# Patient Record
Sex: MALE | Race: WHITE | NOT HISPANIC OR LATINO | Employment: FULL TIME | URBAN - METROPOLITAN AREA
[De-identification: names, ages, dates, MRNs, and addresses within clinical notes are randomized per-mention and may not be internally consistent; named-entity substitution may affect disease eponyms.]

---

## 2023-04-07 ENCOUNTER — TELEPHONE (OUTPATIENT)
Dept: PRIMARY CARE | Facility: CLINIC | Age: 33
End: 2023-04-07
Payer: COMMERCIAL

## 2023-04-10 DIAGNOSIS — F41.9 ANXIETY: ICD-10-CM

## 2023-04-10 DIAGNOSIS — I10 ESSENTIAL HYPERTENSION: Primary | ICD-10-CM

## 2023-04-10 DIAGNOSIS — K21.9 GASTROESOPHAGEAL REFLUX DISEASE WITHOUT ESOPHAGITIS: ICD-10-CM

## 2023-04-10 RX ORDER — LOSARTAN POTASSIUM AND HYDROCHLOROTHIAZIDE 25; 100 MG/1; MG/1
1 TABLET ORAL DAILY
Qty: 90 TABLET | Refills: 3 | Status: SHIPPED | OUTPATIENT
Start: 2023-04-10 | End: 2023-04-11 | Stop reason: SDUPTHER

## 2023-04-10 RX ORDER — AMLODIPINE BESYLATE 5 MG/1
5 TABLET ORAL DAILY
Qty: 7 TABLET | Refills: 0 | Status: SHIPPED | OUTPATIENT
Start: 2023-04-10 | End: 2023-04-10 | Stop reason: SDUPTHER

## 2023-04-10 RX ORDER — PANTOPRAZOLE SODIUM 40 MG/1
40 TABLET, DELAYED RELEASE ORAL DAILY
Qty: 7 TABLET | Refills: 0 | Status: SHIPPED | OUTPATIENT
Start: 2023-04-10 | End: 2023-04-10 | Stop reason: SDUPTHER

## 2023-04-10 RX ORDER — PANTOPRAZOLE SODIUM 40 MG/1
40 TABLET, DELAYED RELEASE ORAL DAILY
Qty: 90 TABLET | Refills: 3 | Status: SHIPPED | OUTPATIENT
Start: 2023-04-10 | End: 2023-04-11 | Stop reason: SDUPTHER

## 2023-04-10 RX ORDER — LOSARTAN POTASSIUM AND HYDROCHLOROTHIAZIDE 25; 100 MG/1; MG/1
1 TABLET ORAL DAILY
COMMUNITY
Start: 2020-11-23 | End: 2023-04-10 | Stop reason: SDUPTHER

## 2023-04-10 RX ORDER — AMLODIPINE BESYLATE 5 MG/1
5 TABLET ORAL DAILY
Qty: 90 TABLET | Refills: 3 | Status: SHIPPED | OUTPATIENT
Start: 2023-04-10 | End: 2023-04-11 | Stop reason: SDUPTHER

## 2023-04-10 RX ORDER — PAROXETINE HYDROCHLORIDE 40 MG/1
1 TABLET, FILM COATED ORAL DAILY
COMMUNITY
Start: 2020-07-14 | End: 2023-04-10 | Stop reason: SDUPTHER

## 2023-04-10 RX ORDER — PAROXETINE HYDROCHLORIDE 40 MG/1
40 TABLET, FILM COATED ORAL DAILY
Qty: 90 TABLET | Refills: 3 | Status: SHIPPED | OUTPATIENT
Start: 2023-04-10 | End: 2023-04-11 | Stop reason: SDUPTHER

## 2023-04-10 RX ORDER — PAROXETINE HYDROCHLORIDE 40 MG/1
40 TABLET, FILM COATED ORAL DAILY
Qty: 7 TABLET | Refills: 0 | Status: SHIPPED | OUTPATIENT
Start: 2023-04-10 | End: 2023-04-10 | Stop reason: SDUPTHER

## 2023-04-10 RX ORDER — AMLODIPINE BESYLATE 5 MG/1
1 TABLET ORAL DAILY
COMMUNITY
Start: 2021-08-20 | End: 2023-04-10 | Stop reason: SDUPTHER

## 2023-04-10 RX ORDER — LOSARTAN POTASSIUM AND HYDROCHLOROTHIAZIDE 25; 100 MG/1; MG/1
1 TABLET ORAL DAILY
Qty: 7 TABLET | Refills: 0 | Status: SHIPPED | OUTPATIENT
Start: 2023-04-10 | End: 2023-04-10 | Stop reason: SDUPTHER

## 2023-04-10 RX ORDER — PANTOPRAZOLE SODIUM 40 MG/1
1 TABLET, DELAYED RELEASE ORAL DAILY
COMMUNITY
Start: 2020-07-14 | End: 2023-04-10 | Stop reason: SDUPTHER

## 2023-04-10 NOTE — PROGRESS NOTES
Subjective   Patient ID: Les Whitney is a 32 y.o. male who presents for No chief complaint on file..  HPI    Review of Systems  No current outpatient medications on file prior to visit.     No current facility-administered medications on file prior to visit.         Objective   Physical Exam    No visits with results within 7 Day(s) from this visit.   Latest known visit with results is:   [facility] Encounter on 09/08/2022   Component Date Value Ref Range Status    Tobacco Screen, Urine 09/08/2022 NEGATIVE   Final    Comment: Cotinine, a metabolite of nicotine, is measured to screen   for nicotine exposure. The cut-off is set at 300ng/mL to   detect active exposure (smoking).    This test was developed and its performance characteristics   were determined by the [facility].         Assessment/Plan

## 2023-04-11 DIAGNOSIS — I10 ESSENTIAL HYPERTENSION: ICD-10-CM

## 2023-04-11 DIAGNOSIS — F41.9 ANXIETY: ICD-10-CM

## 2023-04-11 DIAGNOSIS — K21.9 GASTROESOPHAGEAL REFLUX DISEASE WITHOUT ESOPHAGITIS: ICD-10-CM

## 2023-04-11 RX ORDER — PAROXETINE HYDROCHLORIDE 40 MG/1
40 TABLET, FILM COATED ORAL DAILY
Qty: 7 TABLET | Refills: 0 | Status: SHIPPED | OUTPATIENT
Start: 2023-04-11 | End: 2023-04-14 | Stop reason: SDUPTHER

## 2023-04-11 RX ORDER — PANTOPRAZOLE SODIUM 40 MG/1
40 TABLET, DELAYED RELEASE ORAL DAILY
Qty: 7 TABLET | Refills: 0 | Status: SHIPPED | OUTPATIENT
Start: 2023-04-11 | End: 2023-04-14 | Stop reason: SDUPTHER

## 2023-04-11 RX ORDER — LOSARTAN POTASSIUM AND HYDROCHLOROTHIAZIDE 25; 100 MG/1; MG/1
1 TABLET ORAL DAILY
Qty: 7 TABLET | Refills: 0 | Status: SHIPPED | OUTPATIENT
Start: 2023-04-11 | End: 2023-04-14 | Stop reason: SDUPTHER

## 2023-04-11 RX ORDER — AMLODIPINE BESYLATE 5 MG/1
5 TABLET ORAL DAILY
Qty: 7 TABLET | Refills: 0 | Status: SHIPPED | OUTPATIENT
Start: 2023-04-11 | End: 2023-04-14 | Stop reason: SDUPTHER

## 2023-04-11 NOTE — PROGRESS NOTES
Subjective   Patient ID: Les Whitney is a 32 y.o. male who presents for No chief complaint on file..  HPI    Review of Systems  Current Outpatient Medications on File Prior to Visit   Medication Sig Dispense Refill    amLODIPine (Norvasc) 5 mg tablet Take 1 tablet (5 mg) by mouth once daily. 90 tablet 3    losartan-hydrochlorothiazide (Hyzaar) 100-25 mg tablet Take 1 tablet by mouth once daily. 90 tablet 3    pantoprazole (ProtoNix) 40 mg EC tablet Take 1 tablet (40 mg) by mouth once daily. 90 tablet 3    PARoxetine (Paxil) 40 mg tablet Take 1 tablet (40 mg) by mouth once daily. 90 tablet 3    [DISCONTINUED] amLODIPine (Norvasc) 5 mg tablet Take 1 tablet (5 mg) by mouth once daily.      [DISCONTINUED] amLODIPine (Norvasc) 5 mg tablet Take 1 tablet (5 mg) by mouth once daily for 7 days. 7 tablet 0    [DISCONTINUED] losartan-hydrochlorothiazide (Hyzaar) 100-25 mg tablet Take 1 tablet by mouth once daily.      [DISCONTINUED] losartan-hydrochlorothiazide (Hyzaar) 100-25 mg tablet Take 1 tablet by mouth once daily for 7 days. 7 tablet 0    [DISCONTINUED] pantoprazole (ProtoNix) 40 mg EC tablet Take 1 tablet (40 mg) by mouth once daily.      [DISCONTINUED] pantoprazole (ProtoNix) 40 mg EC tablet Take 1 tablet (40 mg) by mouth once daily for 7 days. 7 tablet 0    [DISCONTINUED] PARoxetine (Paxil) 40 mg tablet Take 1 tablet (40 mg) by mouth once daily.      [DISCONTINUED] PARoxetine (Paxil) 40 mg tablet Take 1 tablet (40 mg) by mouth once daily for 7 days. 7 tablet 0     No current facility-administered medications on file prior to visit.         Objective   Physical Exam    No visits with results within 7 Day(s) from this visit.   Latest known visit with results is:   Legacy Encounter on 09/08/2022   Component Date Value Ref Range Status    Tobacco Screen, Urine 09/08/2022 NEGATIVE   Final    Comment: Cotinine, a metabolite of nicotine, is measured to screen   for nicotine exposure. The cut-off is set at 300ng/mL to    detect active exposure (smoking).    This test was developed and its performance characteristics   were determined by the [facility].         Assessment/Plan

## 2023-04-14 DIAGNOSIS — I10 ESSENTIAL HYPERTENSION: ICD-10-CM

## 2023-04-14 DIAGNOSIS — K21.9 GASTROESOPHAGEAL REFLUX DISEASE WITHOUT ESOPHAGITIS: ICD-10-CM

## 2023-04-14 DIAGNOSIS — F41.9 ANXIETY: ICD-10-CM

## 2023-04-14 RX ORDER — LOSARTAN POTASSIUM AND HYDROCHLOROTHIAZIDE 25; 100 MG/1; MG/1
1 TABLET ORAL DAILY
Qty: 90 TABLET | Refills: 1 | Status: SHIPPED | OUTPATIENT
Start: 2023-04-14 | End: 2023-04-14

## 2023-04-14 RX ORDER — AMLODIPINE BESYLATE 5 MG/1
5 TABLET ORAL DAILY
Qty: 90 TABLET | Refills: 1 | Status: SHIPPED | OUTPATIENT
Start: 2023-04-14 | End: 2023-04-14

## 2023-04-14 RX ORDER — PAROXETINE HYDROCHLORIDE 40 MG/1
40 TABLET, FILM COATED ORAL DAILY
Qty: 90 TABLET | Refills: 1 | Status: SHIPPED | OUTPATIENT
Start: 2023-04-14 | End: 2023-04-14

## 2023-04-14 RX ORDER — PANTOPRAZOLE SODIUM 40 MG/1
40 TABLET, DELAYED RELEASE ORAL DAILY
Qty: 90 TABLET | Refills: 1 | Status: SHIPPED | OUTPATIENT
Start: 2023-04-14 | End: 2023-04-14

## 2023-06-08 DIAGNOSIS — E88.810 INSULIN RESISTANCE SYNDROME: Primary | ICD-10-CM

## 2023-06-22 ENCOUNTER — OFFICE VISIT (OUTPATIENT)
Dept: PRIMARY CARE | Facility: CLINIC | Age: 33
End: 2023-06-22
Payer: COMMERCIAL

## 2023-06-22 VITALS
WEIGHT: 315 LBS | HEART RATE: 79 BPM | RESPIRATION RATE: 16 BRPM | SYSTOLIC BLOOD PRESSURE: 142 MMHG | HEIGHT: 69 IN | BODY MASS INDEX: 46.65 KG/M2 | OXYGEN SATURATION: 97 % | DIASTOLIC BLOOD PRESSURE: 90 MMHG

## 2023-06-22 DIAGNOSIS — E66.01 CLASS 3 SEVERE OBESITY DUE TO EXCESS CALORIES WITH SERIOUS COMORBIDITY AND BODY MASS INDEX (BMI) OF 45.0 TO 49.9 IN ADULT (MULTI): ICD-10-CM

## 2023-06-22 DIAGNOSIS — E88.810 INSULIN RESISTANCE SYNDROME: Primary | ICD-10-CM

## 2023-06-22 DIAGNOSIS — I10 ESSENTIAL HYPERTENSION: ICD-10-CM

## 2023-06-22 PROBLEM — E66.813 CLASS 3 SEVERE OBESITY DUE TO EXCESS CALORIES WITH SERIOUS COMORBIDITY AND BODY MASS INDEX (BMI) OF 45.0 TO 49.9 IN ADULT: Status: ACTIVE | Noted: 2023-06-22

## 2023-06-22 PROCEDURE — 3008F BODY MASS INDEX DOCD: CPT | Performed by: INTERNAL MEDICINE

## 2023-06-22 PROCEDURE — 1036F TOBACCO NON-USER: CPT | Performed by: INTERNAL MEDICINE

## 2023-06-22 PROCEDURE — 3077F SYST BP >= 140 MM HG: CPT | Performed by: INTERNAL MEDICINE

## 2023-06-22 PROCEDURE — 99213 OFFICE O/P EST LOW 20 MIN: CPT | Performed by: INTERNAL MEDICINE

## 2023-06-22 PROCEDURE — 3080F DIAST BP >= 90 MM HG: CPT | Performed by: INTERNAL MEDICINE

## 2023-06-22 RX ORDER — APREMILAST 30 MG/1
1 TABLET, FILM COATED ORAL 2 TIMES DAILY
COMMUNITY
Start: 2022-01-07 | End: 2023-10-20 | Stop reason: SDUPTHER

## 2023-06-22 ASSESSMENT — PATIENT HEALTH QUESTIONNAIRE - PHQ9
2. FEELING DOWN, DEPRESSED OR HOPELESS: NOT AT ALL
SUM OF ALL RESPONSES TO PHQ9 QUESTIONS 1 AND 2: 0
1. LITTLE INTEREST OR PLEASURE IN DOING THINGS: NOT AT ALL

## 2023-06-22 ASSESSMENT — ENCOUNTER SYMPTOMS
DIARRHEA: 0
CHILLS: 0
CONSTIPATION: 0
ENDOCRINE COMMENTS: PER HPI
COUGH: 0
SHORTNESS OF BREATH: 0
VOMITING: 0
JOINT SWELLING: 0
FEVER: 0
MYALGIAS: 0
NAUSEA: 0
DIAPHORESIS: 0

## 2023-06-22 NOTE — PATIENT INSTRUCTIONS
"ALTHOUGH THE TAKING OF RYBELSUS STYLE OF MEDICATION FOR WEIGHT LOSS PURPOSES IS AN \"OFF-LABEL\" USE OF THIS MEDICATION, THE SCIENCE SEEMS TO POINT TO THE FACT THAT THE USE OF THESE MEDICATIONS IN \"PRE-DIABETIC\" PEOPLE TO PREVENT DIABETES IS GOING TO BECOME QUITE USEFUL    2.  IT'S EFFECT DESPITE INITIALLY INCREASING INSULIN, IS TO EFFECT THE \"INCRETIN HORMONE SYSTEM\" WHICH ALLOWS PEOPLE TO FEEL SATIETY AFTER EATING, THEREFORE ABLE TO FILL FULL WITH LESS FOOD.  OVER TIME THE WEIGHT LOSS THAT ENSUES LEADS TO A GREAT DEAL OF POSITIVE PHYSIOLOGIC EFFECTS INCLUDING RESTORATION OF INSULIN SENSITIVITY, WHICH ITSELF CAN PROPGATE A MORE NORMAL BODY WEIGHT MAINTENANCE ABILITY    3.  IT IS ALSO VERY POSSIBLE THAT IN EVENTUALLY REDUCING INSULIN BURDEN IN PEOPLE, THE CORRESPONDING TOTAL BODY INFLAMMATION THAT RESULTS FROM HIGHER CIRCULATING INSULIN WILL RESULT IN LESS INFLAMMATION, AND IN THEORY LESS CARDIOVASCULAR AND NEUROVASCULAR EVENTS - HEART ATTACK AND STROKE    4.  THESE ARE MY OPINIONS ONLY, BUT THUS FAR I BELIEVE MY OPINIONS ARE CORRECT, AS THE MARKET HAS SHOWN THAT EVERYONE WANTS THESE MEDS BECAUSE THEY WORK.    5.  FOLLOW UP IN  R MONTHS OR AS NEEDED    "

## 2023-06-22 NOTE — PROGRESS NOTES
"Subjective   Les Whitney is a 33 y.o. male who presents for HAS RX TO SENT TO [facility] FOR RYBELSUS THAT YOU ALREADY WROTE WOULD LIKE TO DISCUSS FURTHER   PT HAS  INSURANCE    HPI   NEED A CHECK IN    80 POUNDS OVER PRE-COVID WEIGHT, STRUGGLING WITH IT    AT ONE POINT RX RYBELSUS FOR WEIGHT LOSS.  LOST 30 POUNDS IN 2.5  MONTHS    IN 2021 GOT COVID, THEN HAD REALLY BAD PSORIASIS ALLOVER BODY, WENT TO DERM, GOT ON OTEZLA, WORKED AFTER 6 MONTHS    SIDE EFFECTS FROM OTEZLA WERE SO BAD RE: NAUSEA, SO STOPPED RYBELSUS    NOW NO PSORIASIS, TAKING HALF NORMAL DOSE OTEZLA, SO WANT TO TRY GET BACK ON RYBELSUS      Review of Systems   Constitutional:  Negative for chills, diaphoresis and fever.   Respiratory:  Negative for cough and shortness of breath.    Cardiovascular:  Negative for chest pain and leg swelling.   Gastrointestinal:  Negative for constipation, diarrhea, nausea and vomiting.   Endocrine:        PER HPI   Musculoskeletal:  Negative for joint swelling and myalgias.       Objective   /90   Pulse 79   Resp 16   Ht 1.74 m (5' 8.5\")   Wt 149 kg (329 lb)   SpO2 97%   BMI 49.30 kg/m²     Physical Exam  Vitals reviewed.   Constitutional:       General: He is not in acute distress.     Appearance: He is obese. He is not ill-appearing.   Cardiovascular:      Rate and Rhythm: Normal rate and regular rhythm.      Pulses: Normal pulses.      Heart sounds:      No gallop.   Pulmonary:      Breath sounds: Normal breath sounds. No wheezing, rhonchi or rales.   Abdominal:      General: Abdomen is flat. Bowel sounds are normal.      Palpations: Abdomen is soft.      Tenderness: There is no guarding or rebound.   Musculoskeletal:      Right lower leg: No edema.      Left lower leg: No edema.         Assessment/Plan   Problem List Items Addressed This Visit       Essential hypertension    Insulin resistance syndrome - Primary    Class 3 severe obesity due to excess calories with serious comorbidity and body mass " "index (BMI) of 45.0 to 49.9 in adult (CMS/[facility])     Patient Instructions   ALTHOUGH THE TAKING OF RYBELSUS STYLE OF MEDICATION FOR WEIGHT LOSS PURPOSES IS AN \"OFF-LABEL\" USE OF THIS MEDICATION, THE SCIENCE SEEMS TO POINT TO THE FACT THAT THE USE OF THESE MEDICATIONS IN \"PRE-DIABETIC\" PEOPLE TO PREVENT DIABETES IS GOING TO BECOME QUITE USEFUL    2.  IT'S EFFECT DESPITE INITIALLY INCREASING INSULIN, IS TO EFFECT THE \"INCRETIN HORMONE SYSTEM\" WHICH ALLOWS PEOPLE TO FEEL SATIETY AFTER EATING, THEREFORE ABLE TO FILL FULL WITH LESS FOOD.  OVER TIME THE WEIGHT LOSS THAT ENSUES LEADS TO A GREAT DEAL OF POSITIVE PHYSIOLOGIC EFFECTS INCLUDING RESTORATION OF INSULIN SENSITIVITY, WHICH ITSELF CAN PROPGATE A MORE NORMAL BODY WEIGHT MAINTENANCE ABILITY    3.  IT IS ALSO VERY POSSIBLE THAT IN EVENTUALLY REDUCING INSULIN BURDEN IN PEOPLE, THE CORRESPONDING TOTAL BODY INFLAMMATION THAT RESULTS FROM HIGHER CIRCULATING INSULIN WILL RESULT IN LESS INFLAMMATION, AND IN THEORY LESS CARDIOVASCULAR AND NEUROVASCULAR EVENTS - HEART ATTACK AND STROKE    4.  THESE ARE MY OPINIONS ONLY, BUT THUS FAR I BELIEVE MY OPINIONS ARE CORRECT, AS THE MARKET HAS SHOWN THAT EVERYONE WANTS THESE MEDS BECAUSE THEY WORK.    5.  FOLLOW UP IN  R MONTHS OR AS NEEDED    "

## 2023-10-02 DIAGNOSIS — F41.9 ANXIETY: ICD-10-CM

## 2023-10-02 DIAGNOSIS — I10 ESSENTIAL HYPERTENSION: ICD-10-CM

## 2023-10-02 DIAGNOSIS — K21.9 GASTROESOPHAGEAL REFLUX DISEASE WITHOUT ESOPHAGITIS: ICD-10-CM

## 2023-10-05 RX ORDER — LOSARTAN POTASSIUM AND HYDROCHLOROTHIAZIDE 25; 100 MG/1; MG/1
1 TABLET ORAL DAILY
Qty: 90 TABLET | Refills: 3 | Status: SHIPPED | OUTPATIENT
Start: 2023-10-05 | End: 2024-09-29

## 2023-10-05 RX ORDER — AMLODIPINE BESYLATE 5 MG/1
5 TABLET ORAL
Qty: 90 TABLET | Refills: 1 | Status: SHIPPED | OUTPATIENT
Start: 2023-10-05 | End: 2024-04-03 | Stop reason: SDUPTHER

## 2023-10-05 RX ORDER — PAROXETINE HYDROCHLORIDE 40 MG/1
40 TABLET, FILM COATED ORAL
Qty: 90 TABLET | Refills: 1 | Status: SHIPPED | OUTPATIENT
Start: 2023-10-05 | End: 2024-04-03 | Stop reason: SDUPTHER

## 2023-10-05 RX ORDER — PANTOPRAZOLE SODIUM 40 MG/1
40 TABLET, DELAYED RELEASE ORAL
Qty: 90 TABLET | Refills: 1 | Status: SHIPPED | OUTPATIENT
Start: 2023-10-05 | End: 2024-04-03 | Stop reason: SDUPTHER

## 2023-10-06 ENCOUNTER — PHARMACY VISIT (OUTPATIENT)
Dept: PHARMACY | Facility: CLINIC | Age: 33
End: 2023-10-06
Payer: COMMERCIAL

## 2023-10-06 PROCEDURE — RXMED WILLOW AMBULATORY MEDICATION CHARGE

## 2023-10-18 PROBLEM — Z98.84 BARIATRIC SURGERY STATUS: Status: ACTIVE | Noted: 2023-10-18

## 2023-10-18 PROBLEM — G47.33 OSA ON CPAP: Status: ACTIVE | Noted: 2023-10-18

## 2023-10-18 PROBLEM — B35.4 TINEA CORPORIS: Status: ACTIVE | Noted: 2023-10-18

## 2023-10-18 PROBLEM — D22.5 MELANOCYTIC NEVI OF TRUNK: Status: ACTIVE | Noted: 2022-09-30

## 2023-10-18 PROBLEM — L40.4 GUTTATE PSORIASIS: Status: ACTIVE | Noted: 2022-01-14

## 2023-10-18 PROBLEM — L30.9 ECZEMA: Status: ACTIVE | Noted: 2023-10-18

## 2023-10-18 PROBLEM — J32.9 CHRONIC SINUSITIS: Status: ACTIVE | Noted: 2023-10-18

## 2023-10-18 PROBLEM — T78.40XA ALLERGIC REACTION: Status: ACTIVE | Noted: 2023-10-18

## 2023-10-18 PROBLEM — H65.90 OTITIS MEDIA WITH EFFUSION: Status: ACTIVE | Noted: 2023-10-18

## 2023-10-18 PROBLEM — M25.649 FINGER STIFFNESS: Status: ACTIVE | Noted: 2023-10-18

## 2023-10-18 PROBLEM — L40.0 PSORIASIS VULGARIS: Status: ACTIVE | Noted: 2022-01-13

## 2023-10-18 PROBLEM — D48.5 NEOPLASM OF UNCERTAIN BEHAVIOR OF SKIN: Status: ACTIVE | Noted: 2022-09-30

## 2023-10-18 PROBLEM — R69 DISEASE SUSPECTED: Status: ACTIVE | Noted: 2023-10-18

## 2023-10-18 PROBLEM — L40.9 PSORIASIS: Status: ACTIVE | Noted: 2023-10-18

## 2023-10-18 PROBLEM — R60.0 LOWER EXTREMITY EDEMA: Status: ACTIVE | Noted: 2023-10-18

## 2023-10-18 PROBLEM — L57.8 OTHER SKIN CHANGES DUE TO CHRONIC EXPOSURE TO NONIONIZING RADIATION: Status: ACTIVE | Noted: 2023-07-21

## 2023-10-18 PROBLEM — R53.83 FATIGUE: Status: ACTIVE | Noted: 2023-10-18

## 2023-10-18 PROBLEM — R29.818 SUSPECTED SLEEP APNEA: Status: ACTIVE | Noted: 2023-10-18

## 2023-10-18 PROBLEM — L21.9 SEBORRHEIC DERMATITIS: Status: ACTIVE | Noted: 2023-10-18

## 2023-10-18 PROBLEM — E88.810 METABOLIC SYNDROME: Status: ACTIVE | Noted: 2023-10-18

## 2023-10-18 PROBLEM — F41.1 GENERALIZED ANXIETY DISORDER: Status: ACTIVE | Noted: 2023-10-18

## 2023-10-18 PROBLEM — L30.9 DERMATITIS, UNSPECIFIED: Status: ACTIVE | Noted: 2023-10-18

## 2023-10-18 PROBLEM — R10.13 DYSPEPSIA: Status: ACTIVE | Noted: 2023-10-18

## 2023-10-18 RX ORDER — ORAL SEMAGLUTIDE 7 MG/1
1 TABLET ORAL
COMMUNITY
Start: 2021-01-08 | End: 2023-10-20 | Stop reason: ALTCHOICE

## 2023-10-18 RX ORDER — SALICYLIC ACID 0.03 G/ML
SHAMPOO TOPICAL
COMMUNITY
Start: 2021-12-09 | End: 2023-10-20 | Stop reason: ALTCHOICE

## 2023-10-18 RX ORDER — DOXYCYCLINE 100 MG/1
100 CAPSULE ORAL EVERY 12 HOURS
COMMUNITY
Start: 2022-11-13 | End: 2023-10-20 | Stop reason: ALTCHOICE

## 2023-10-18 RX ORDER — POLYMYXIN B SULFATE AND TRIMETHOPRIM 1; 10000 MG/ML; [USP'U]/ML
SOLUTION OPHTHALMIC
COMMUNITY
Start: 2022-11-14 | End: 2023-10-20 | Stop reason: ALTCHOICE

## 2023-10-18 RX ORDER — LOSARTAN POTASSIUM 100 MG/1
50 TABLET ORAL
COMMUNITY
Start: 2020-05-19 | End: 2023-12-28 | Stop reason: ALTCHOICE

## 2023-10-18 RX ORDER — SEMAGLUTIDE 1 MG/.5ML
1 INJECTION, SOLUTION SUBCUTANEOUS
COMMUNITY
Start: 2021-07-26 | End: 2023-10-20 | Stop reason: ALTCHOICE

## 2023-10-18 RX ORDER — CALCIPOTRIENE AND BETAMETHASONE DIPROPIONATE 50; .5 UG/G; MG/G
1 AEROSOL, FOAM TOPICAL
COMMUNITY
Start: 2022-01-07 | End: 2023-12-28 | Stop reason: ALTCHOICE

## 2023-10-18 RX ORDER — LOSARTAN POTASSIUM 25 MG/1
25 TABLET ORAL 4 TIMES DAILY
COMMUNITY
Start: 2020-02-10 | End: 2023-10-20 | Stop reason: ALTCHOICE

## 2023-10-18 RX ORDER — FLUOXETINE HYDROCHLORIDE 40 MG/1
40 CAPSULE ORAL
COMMUNITY
Start: 2020-02-03 | End: 2023-10-20 | Stop reason: ALTCHOICE

## 2023-10-18 RX ORDER — HYDROCORTISONE 25 MG/G
CREAM TOPICAL 2 TIMES DAILY
COMMUNITY
Start: 2021-12-09 | End: 2023-10-20 | Stop reason: ALTCHOICE

## 2023-10-18 RX ORDER — ALBUTEROL SULFATE 90 UG/1
2 AEROSOL, METERED RESPIRATORY (INHALATION) 4 TIMES DAILY PRN
COMMUNITY
End: 2023-10-20 | Stop reason: ALTCHOICE

## 2023-10-18 RX ORDER — LOSARTAN POTASSIUM 50 MG/1
50 TABLET ORAL DAILY
COMMUNITY
Start: 2019-03-21 | End: 2023-10-20 | Stop reason: ALTCHOICE

## 2023-10-18 RX ORDER — PAROXETINE HYDROCHLORIDE 40 MG/1
40 TABLET, FILM COATED ORAL DAILY
COMMUNITY
Start: 2020-07-14 | End: 2023-10-20 | Stop reason: SDUPTHER

## 2023-10-18 RX ORDER — KETOCONAZOLE 20 MG/G
CREAM TOPICAL 2 TIMES DAILY
COMMUNITY
Start: 2021-12-09 | End: 2023-10-20 | Stop reason: ALTCHOICE

## 2023-10-18 RX ORDER — ERGOCALCIFEROL 1.25 MG/1
50000 CAPSULE ORAL 2 TIMES WEEKLY
COMMUNITY
Start: 2020-11-24 | End: 2023-10-20 | Stop reason: ALTCHOICE

## 2023-10-20 ENCOUNTER — OFFICE VISIT (OUTPATIENT)
Dept: DERMATOLOGY | Facility: CLINIC | Age: 33
End: 2023-10-20
Payer: COMMERCIAL

## 2023-10-20 DIAGNOSIS — Z79.899 ENCOUNTER FOR LONG-TERM (CURRENT) USE OF MEDICATIONS: ICD-10-CM

## 2023-10-20 DIAGNOSIS — L40.0 PSORIASIS VULGARIS: Primary | ICD-10-CM

## 2023-10-20 PROCEDURE — 99213 OFFICE O/P EST LOW 20 MIN: CPT | Performed by: DERMATOLOGY

## 2023-10-20 PROCEDURE — 1036F TOBACCO NON-USER: CPT | Performed by: DERMATOLOGY

## 2023-10-20 PROCEDURE — 3008F BODY MASS INDEX DOCD: CPT | Performed by: DERMATOLOGY

## 2023-10-20 ASSESSMENT — DERMATOLOGY PATIENT ASSESSMENT
DO YOU USE SUNSCREEN: OCCASIONALLY
DO YOU USE A TANNING BED: NO
FOR PATIENTS COMING IN FOR A FOLLOW-UP VISIT - HAVE THERE BEEN ANY CHANGES IN YOUR HEALTH SINCE YOUR LAST VISIT: NO
DO YOU HAVE ANY NEW OR CHANGING LESIONS: NO
ARE YOU AN ORGAN TRANSPLANT RECIPIENT: NO

## 2023-10-20 ASSESSMENT — DERMATOLOGY QUALITY OF LIFE (QOL) ASSESSMENT
ARE THERE EXCLUSIONS OR EXCEPTIONS FOR THE QUALITY OF LIFE ASSESSMENT: NO
RATE HOW EMOTIONALLY BOTHERED YOU ARE BY YOUR SKIN PROBLEM (FOR EXAMPLE, WORRY, EMBARRASSMENT, FRUSTRATION): 0 - NEVER BOTHERED
WHAT SINGLE SKIN CONDITION LISTED BELOW IS THE PATIENT ANSWERING THE QUALITY-OF-LIFE ASSESSMENT QUESTIONS ABOUT: PSORIASIS
RATE HOW BOTHERED YOU ARE BY EFFECTS OF YOUR SKIN PROBLEMS ON YOUR ACTIVITIES (EG, GOING OUT, ACCOMPLISHING WHAT YOU WANT, WORK ACTIVITIES OR YOUR RELATIONSHIPS WITH OTHERS): 0 - NEVER BOTHERED
RATE HOW BOTHERED YOU ARE BY SYMPTOMS OF YOUR SKIN PROBLEM (EG, ITCHING, STINGING BURNING, HURTING OR SKIN IRRITATION): 2

## 2023-10-20 ASSESSMENT — ITCH NUMERIC RATING SCALE: HOW SEVERE IS YOUR ITCHING?: 0

## 2023-10-20 NOTE — PROGRESS NOTES
Subjective     Les Whitney is a 33 y.o. male who presents for the following: Psoriasis (Pt here following up on Psoriasis, widespread over body. Current treatment Otezla 30mg QD-BID, has GI issues sometimes. Has Enstilar foam to use PRN for flares. Well controlled on regimen. Joint pain in left hand. ).     Review of Systems:  No other skin or systemic complaints other than what is documented elsewhere in the note.    The following portions of the chart were reviewed this encounter and updated as appropriate:          Skin Cancer History  No skin cancer on file.      Specialty Problems          Dermatology Problems    Psoriasis vulgaris    Guttate psoriasis    Melanocytic nevi of trunk    Neoplasm of uncertain behavior of skin    Other skin changes due to chronic exposure to nonionizing radiation    Dermatitis, unspecified    Eczema    Psoriasis    Seborrheic dermatitis    Tinea corporis        Objective   Well appearing patient in no apparent distress; mood and affect are within normal limits.    A focused skin examination was performed. All findings within normal limits unless otherwise noted below.    Assessment/Plan   1. Psoriasis vulgaris  Clear today, no active lesions    The chronic and intermittently flaring nature of this skin condition was discussed today.   This is an inflammatory skin condition that occurs in a bimodal age distribution in genetically pre-disposed individuals. The inflammatory skin condition causes skin to turn over 2x faster than normal skin. T  he various treatment options were reviewed which include: topical steroids, topical retinoids, nb-UVB therapy, systemic medications including oral medications and injectable (biologic) medications.     Patient well controlled on oral Otzela 30mg 2x daily and we recommend continuation  Continue Enstilar 2x daily as needed, declines need for refills at this time.    Side effects of medications reviewed.    We discussed if he does get a tattoo it  is a possibility that psoriasis could occur at tattoo site.    Related Procedures  Follow Up In Dermatology - Established Patient    Related Medications  apremilast (Otezla) 30 mg tablet  Take 1 tab 2x daily

## 2023-10-23 ENCOUNTER — PHARMACY VISIT (OUTPATIENT)
Dept: PHARMACY | Facility: CLINIC | Age: 33
End: 2023-10-23
Payer: COMMERCIAL

## 2023-10-23 PROCEDURE — RXMED WILLOW AMBULATORY MEDICATION CHARGE

## 2023-11-01 ENCOUNTER — SPECIALTY PHARMACY (OUTPATIENT)
Dept: PHARMACY | Facility: CLINIC | Age: 33
End: 2023-11-01

## 2023-11-30 ENCOUNTER — PHARMACY VISIT (OUTPATIENT)
Dept: PHARMACY | Facility: CLINIC | Age: 33
End: 2023-11-30
Payer: COMMERCIAL

## 2023-11-30 ENCOUNTER — SPECIALTY PHARMACY (OUTPATIENT)
Dept: PHARMACY | Facility: CLINIC | Age: 33
End: 2023-11-30

## 2023-11-30 PROCEDURE — RXMED WILLOW AMBULATORY MEDICATION CHARGE

## 2023-12-18 ENCOUNTER — SPECIALTY PHARMACY (OUTPATIENT)
Dept: PHARMACY | Facility: CLINIC | Age: 33
End: 2023-12-18

## 2023-12-21 ENCOUNTER — SPECIALTY PHARMACY (OUTPATIENT)
Dept: PHARMACY | Facility: CLINIC | Age: 33
End: 2023-12-21

## 2023-12-21 NOTE — PROGRESS NOTES
[facility] Clinical Note    Les Whitney is a 33 y.o. male, who is on the specialty pharmacy service for management of: Dermatology Core with status of: (Enrolled)     Les was contacted on 12/21/2023.    Refer to the encounter summary report for documentation details about patient counseling and education.      Medication Adherence  The importance of adherence was discussed with the patient and they were advised to take the medication as prescribed by their provider. Les was encouraged to call his physician's office if they have a question regarding a missed dose.        Patient advised to contact the pharmacy if there are any changes to her medication list, including prescriptions, OTC medications, herbal products, or supplements. Patient was advised of [facility]’s dispensing process, refill timeline, contact information ([phone]), and patient management follow up. Patient confirmed understanding of education conducted during assessment. All patient questions and concerns were addressed to the best of my ability. Patient was encouraged to contact the specialty pharmacy with any questions or concerns.    Confirmed follow-up outreaches are properly scheduled. Reviewed goals of therapy in the program targets.    Ishan Durant, PharmD

## 2023-12-28 ENCOUNTER — LAB (OUTPATIENT)
Dept: LAB | Facility: LAB | Age: 33
End: 2023-12-28
Payer: COMMERCIAL

## 2023-12-28 ENCOUNTER — OFFICE VISIT (OUTPATIENT)
Dept: PRIMARY CARE | Facility: CLINIC | Age: 33
End: 2023-12-28
Payer: COMMERCIAL

## 2023-12-28 VITALS
HEART RATE: 84 BPM | OXYGEN SATURATION: 95 % | DIASTOLIC BLOOD PRESSURE: 76 MMHG | SYSTOLIC BLOOD PRESSURE: 132 MMHG | HEIGHT: 69 IN | BODY MASS INDEX: 46.65 KG/M2 | RESPIRATION RATE: 14 BRPM | WEIGHT: 315 LBS

## 2023-12-28 DIAGNOSIS — R71.8 MICROCYTOSIS: ICD-10-CM

## 2023-12-28 DIAGNOSIS — E55.9 VITAMIN D DEFICIENCY: ICD-10-CM

## 2023-12-28 DIAGNOSIS — Z23 NEED FOR INFLUENZA VACCINATION: ICD-10-CM

## 2023-12-28 DIAGNOSIS — R71.8 MICROCYTOSIS: Primary | ICD-10-CM

## 2023-12-28 DIAGNOSIS — R53.82 CHRONIC FATIGUE: ICD-10-CM

## 2023-12-28 DIAGNOSIS — I10 ESSENTIAL HYPERTENSION: ICD-10-CM

## 2023-12-28 DIAGNOSIS — I10 ESSENTIAL HYPERTENSION: Primary | ICD-10-CM

## 2023-12-28 DIAGNOSIS — R74.01 TRANSAMINITIS: ICD-10-CM

## 2023-12-28 DIAGNOSIS — E87.6 HYPOKALEMIA: ICD-10-CM

## 2023-12-28 LAB
25(OH)D3 SERPL-MCNC: 18 NG/ML (ref 30–100)
ALBUMIN SERPL BCP-MCNC: 4.6 G/DL (ref 3.4–5)
ALP SERPL-CCNC: 72 U/L (ref 33–120)
ALT SERPL W P-5'-P-CCNC: 67 U/L (ref 10–52)
ANION GAP SERPL CALC-SCNC: 12 MMOL/L (ref 10–20)
AST SERPL W P-5'-P-CCNC: 51 U/L (ref 9–39)
BILIRUB SERPL-MCNC: 1.1 MG/DL (ref 0–1.2)
BUN SERPL-MCNC: 18 MG/DL (ref 6–23)
CALCIUM SERPL-MCNC: 9.3 MG/DL (ref 8.6–10.3)
CHLORIDE SERPL-SCNC: 99 MMOL/L (ref 98–107)
CHOLEST SERPL-MCNC: 204 MG/DL (ref 0–199)
CHOLESTEROL/HDL RATIO: 6.7
CO2 SERPL-SCNC: 28 MMOL/L (ref 21–32)
CREAT SERPL-MCNC: 0.79 MG/DL (ref 0.5–1.3)
CREAT UR-MCNC: 162.8 MG/DL (ref 20–370)
ERYTHROCYTE [DISTWIDTH] IN BLOOD BY AUTOMATED COUNT: 14.2 % (ref 11.5–14.5)
FERRITIN SERPL-MCNC: 125 NG/ML (ref 20–300)
GFR SERPL CREATININE-BSD FRML MDRD: >90 ML/MIN/1.73M*2
GLUCOSE SERPL-MCNC: 83 MG/DL (ref 74–99)
HCT VFR BLD AUTO: 41.8 % (ref 41–52)
HDLC SERPL-MCNC: 30.3 MG/DL
HGB BLD-MCNC: 13.6 G/DL (ref 13.5–17.5)
IRON SATN MFR SERPL: 11 % (ref 25–45)
IRON SERPL-MCNC: 37 UG/DL (ref 35–150)
LDLC SERPL CALC-MCNC: 153 MG/DL
MCH RBC QN AUTO: 25 PG (ref 26–34)
MCHC RBC AUTO-ENTMCNC: 32.5 G/DL (ref 32–36)
MCV RBC AUTO: 77 FL (ref 80–100)
NON HDL CHOLESTEROL: 174 MG/DL (ref 0–149)
NRBC BLD-RTO: 0 /100 WBCS (ref 0–0)
PLATELET # BLD AUTO: 206 X10*3/UL (ref 150–450)
POTASSIUM SERPL-SCNC: 3.4 MMOL/L (ref 3.5–5.3)
PROT SERPL-MCNC: 7.7 G/DL (ref 6.4–8.2)
PROT UR-ACNC: 9 MG/DL (ref 5–25)
PROT/CREAT UR: 0.06 MG/MG CREAT (ref 0–0.17)
RBC # BLD AUTO: 5.45 X10*6/UL (ref 4.5–5.9)
SODIUM SERPL-SCNC: 136 MMOL/L (ref 136–145)
TIBC SERPL-MCNC: 344 UG/DL (ref 240–445)
TRIGL SERPL-MCNC: 102 MG/DL (ref 0–149)
TSH SERPL-ACNC: 1.94 MIU/L (ref 0.44–3.98)
UIBC SERPL-MCNC: 307 UG/DL (ref 110–370)
VIT B12 SERPL-MCNC: 503 PG/ML (ref 211–911)
VLDL: 20 MG/DL (ref 0–40)
WBC # BLD AUTO: 8 X10*3/UL (ref 4.4–11.3)

## 2023-12-28 PROCEDURE — 85027 COMPLETE CBC AUTOMATED: CPT

## 2023-12-28 PROCEDURE — 82570 ASSAY OF URINE CREATININE: CPT

## 2023-12-28 PROCEDURE — 82607 VITAMIN B-12: CPT

## 2023-12-28 PROCEDURE — 83550 IRON BINDING TEST: CPT

## 2023-12-28 PROCEDURE — 3078F DIAST BP <80 MM HG: CPT | Performed by: INTERNAL MEDICINE

## 2023-12-28 PROCEDURE — 3008F BODY MASS INDEX DOCD: CPT | Performed by: INTERNAL MEDICINE

## 2023-12-28 PROCEDURE — 82306 VITAMIN D 25 HYDROXY: CPT

## 2023-12-28 PROCEDURE — 82728 ASSAY OF FERRITIN: CPT

## 2023-12-28 PROCEDURE — 36415 COLL VENOUS BLD VENIPUNCTURE: CPT

## 2023-12-28 PROCEDURE — 90471 IMMUNIZATION ADMIN: CPT | Performed by: INTERNAL MEDICINE

## 2023-12-28 PROCEDURE — 83540 ASSAY OF IRON: CPT

## 2023-12-28 PROCEDURE — 99213 OFFICE O/P EST LOW 20 MIN: CPT | Performed by: INTERNAL MEDICINE

## 2023-12-28 PROCEDURE — 3075F SYST BP GE 130 - 139MM HG: CPT | Performed by: INTERNAL MEDICINE

## 2023-12-28 PROCEDURE — 1036F TOBACCO NON-USER: CPT | Performed by: INTERNAL MEDICINE

## 2023-12-28 PROCEDURE — 84156 ASSAY OF PROTEIN URINE: CPT

## 2023-12-28 PROCEDURE — 80053 COMPREHEN METABOLIC PANEL: CPT

## 2023-12-28 PROCEDURE — 80061 LIPID PANEL: CPT

## 2023-12-28 PROCEDURE — 84443 ASSAY THYROID STIM HORMONE: CPT

## 2023-12-28 PROCEDURE — 90686 IIV4 VACC NO PRSV 0.5 ML IM: CPT | Performed by: INTERNAL MEDICINE

## 2023-12-28 ASSESSMENT — PATIENT HEALTH QUESTIONNAIRE - PHQ9
SUM OF ALL RESPONSES TO PHQ9 QUESTIONS 1 AND 2: 0
1. LITTLE INTEREST OR PLEASURE IN DOING THINGS: NOT AT ALL
2. FEELING DOWN, DEPRESSED OR HOPELESS: NOT AT ALL

## 2023-12-28 ASSESSMENT — ENCOUNTER SYMPTOMS
DIARRHEA: 0
FEVER: 0
JOINT SWELLING: 0
DIAPHORESIS: 0
NAUSEA: 0
CONSTIPATION: 0
VOMITING: 0
COUGH: 0
CHILLS: 0
MYALGIAS: 0
SHORTNESS OF BREATH: 0

## 2023-12-28 NOTE — PATIENT INSTRUCTIONS
FASTING LABS ARE ORDERED FOR YOU    2.  THE RASH THAT YOU HAD PER YOUR PICTURE SEEMS CONSISTENT WITH A NUMMULAR ECZEMA TYPE OF SITUATION, POTENTIALLY RELATED TO A PSORIASIS-TYPE FLARE FROM COVID.  THE PICTURE IS NOT CONSISTENT WITH THE EXPANDING TARGET (ERYTHEMA CHRONICA MIGRANS)LESION OF LYME DISEASE    3.  PLEASE CALL IF REFILLS NEEDED    4.  EVENTUALLY MAY TRIAL THE INJECTABLE WEIGHT LOSS ALTERNATIVE TO RYBELSUS    5.  YEARLY EYE EXAMS RECOMMENDED    6.  6 MONTH FOLLOW UP OR AS NEEDED    7.  FLU SHOT TODAY

## 2023-12-28 NOTE — PROGRESS NOTES
"Subjective   Les Whitnye is a 33 y.o. male who presents for FOLLOW UP   WOULD LIKE FLU SHOT TODAY     HPI   A FEW MONTHS AGO HAD A WEIRD THING ON LEG    A SWELLING AND INFLAMMATION THING, HAD IT FOR 5 DAYS, WAS GETTING LARGER, WENT TO URGENT CARE    THOUGHT IT WAS A BUG BITE?  THOUGHT INFLAMMATION RELATED TO THAT, PUT STEROID CREAM ON IT AND IT WENT AWAY    THEN A WEEK LATER GOT IT BACK ON THE SAME LEG IN A DIFFERENT SPOT.  HAPPENED TO HAVE COVID AT THE SAME TIME, AND LAST TIME HAD COVID, HAD A PSORIASIS FL;;ARE    WHEN SAW DERM IN 2021, HAD A PSORIASIS FLARE.  ON OTEZLA      Review of Systems   Constitutional:  Negative for chills, diaphoresis and fever.   Respiratory:  Negative for cough and shortness of breath.    Cardiovascular:  Negative for chest pain and leg swelling.   Gastrointestinal:  Negative for constipation, diarrhea, nausea and vomiting.   Musculoskeletal:  Negative for joint swelling and myalgias.   Skin:  Positive for rash.       Objective   /76   Pulse 84   Resp 14   Ht 1.74 m (5' 8.5\")   Wt 147 kg (323 lb)   SpO2 95%   BMI 48.40 kg/m²     Physical Exam  Vitals reviewed.   Constitutional:       General: He is not in acute distress.     Appearance: He is not ill-appearing.   Cardiovascular:      Rate and Rhythm: Normal rate and regular rhythm.      Pulses: Normal pulses.      Heart sounds:      No gallop.   Pulmonary:      Breath sounds: Normal breath sounds. No wheezing, rhonchi or rales.   Abdominal:      General: Abdomen is flat. Bowel sounds are normal.      Palpations: Abdomen is soft.      Tenderness: There is no guarding or rebound.   Musculoskeletal:      Right lower leg: No edema.      Left lower leg: No edema.         Assessment/Plan   Problem List Items Addressed This Visit       Essential hypertension    Relevant Orders    CBC    Lipid Panel    Comprehensive Metabolic Panel    Protein, Urine Random    Fatigue - Primary    Relevant Orders    TSH with reflex to Free T4 if " abnormal    Vitamin B12    Vitamin D deficiency    Relevant Orders    Vitamin D 25-Hydroxy,Total (for eval of Vitamin D levels)     Other Visit Diagnoses       Need for influenza vaccination        Relevant Orders    Flu vaccine (IIV4) age 6 months and greater, preservative free (Completed)          Patient Instructions    FASTING LABS ARE ORDERED FOR YOU    2.  THE RASH THAT YOU HAD PER YOUR PICTURE SEEMS CONSISTENT WITH A NUMMULAR ECZEMA TYPE OF SITUATION, POTENTIALLY RELATED TO A PSORIASIS-TYPE FLARE FROM COVID.  THE PICTURE IS NOT CONSISTENT WITH THE EXPANDING TARGET (ERYTHEMA CHRONICA MIGRANS)LESION OF LYME DISEASE    3.  PLEASE CALL IF REFILLS NEEDED    4.  EVENTUALLY MAY TRIAL THE INJECTABLE WEIGHT LOSS ALTERNATIVE TO RYBELSUS    5.  YEARLY EYE EXAMS RECOMMENDED    6.  6 MONTH FOLLOW UP OR AS NEEDED    7.  FLU SHOT TODAY

## 2024-01-02 RX ORDER — POTASSIUM CHLORIDE 750 MG/1
10 TABLET, FILM COATED, EXTENDED RELEASE ORAL DAILY
Qty: 90 TABLET | Refills: 3 | Status: SHIPPED | OUTPATIENT
Start: 2024-01-02 | End: 2025-01-01

## 2024-01-03 PROCEDURE — RXMED WILLOW AMBULATORY MEDICATION CHARGE

## 2024-01-06 PROCEDURE — RXMED WILLOW AMBULATORY MEDICATION CHARGE

## 2024-01-08 ENCOUNTER — PHARMACY VISIT (OUTPATIENT)
Dept: PHARMACY | Facility: CLINIC | Age: 34
End: 2024-01-08
Payer: COMMERCIAL

## 2024-01-20 ENCOUNTER — SPECIALTY PHARMACY (OUTPATIENT)
Dept: PHARMACY | Facility: CLINIC | Age: 34
End: 2024-01-20

## 2024-01-20 PROCEDURE — RXMED WILLOW AMBULATORY MEDICATION CHARGE

## 2024-02-01 ENCOUNTER — PHARMACY VISIT (OUTPATIENT)
Dept: PHARMACY | Facility: CLINIC | Age: 34
End: 2024-02-01
Payer: COMMERCIAL

## 2024-03-11 ENCOUNTER — OFFICE VISIT (OUTPATIENT)
Dept: GASTROENTEROLOGY | Facility: CLINIC | Age: 34
End: 2024-03-11
Payer: COMMERCIAL

## 2024-03-11 VITALS
SYSTOLIC BLOOD PRESSURE: 138 MMHG | BODY MASS INDEX: 42.09 KG/M2 | DIASTOLIC BLOOD PRESSURE: 76 MMHG | HEART RATE: 77 BPM | HEIGHT: 70 IN | WEIGHT: 294 LBS

## 2024-03-11 DIAGNOSIS — E66.01 CLASS 3 SEVERE OBESITY DUE TO EXCESS CALORIES WITH SERIOUS COMORBIDITY AND BODY MASS INDEX (BMI) OF 45.0 TO 49.9 IN ADULT (MULTI): Primary | ICD-10-CM

## 2024-03-11 DIAGNOSIS — Z12.11 COLON CANCER SCREENING: ICD-10-CM

## 2024-03-11 DIAGNOSIS — R74.01 TRANSAMINITIS: ICD-10-CM

## 2024-03-11 DIAGNOSIS — K21.9 GASTROESOPHAGEAL REFLUX DISEASE WITHOUT ESOPHAGITIS: ICD-10-CM

## 2024-03-11 DIAGNOSIS — Z80.0 FAMILY HX OF COLON CANCER: ICD-10-CM

## 2024-03-11 DIAGNOSIS — G47.33 OSA ON CPAP: ICD-10-CM

## 2024-03-11 DIAGNOSIS — E88.810 METABOLIC SYNDROME: ICD-10-CM

## 2024-03-11 DIAGNOSIS — F41.1 GENERALIZED ANXIETY DISORDER: ICD-10-CM

## 2024-03-11 PROCEDURE — RXMED WILLOW AMBULATORY MEDICATION CHARGE

## 2024-03-11 PROCEDURE — 1036F TOBACCO NON-USER: CPT | Performed by: STUDENT IN AN ORGANIZED HEALTH CARE EDUCATION/TRAINING PROGRAM

## 2024-03-11 PROCEDURE — 3078F DIAST BP <80 MM HG: CPT | Performed by: STUDENT IN AN ORGANIZED HEALTH CARE EDUCATION/TRAINING PROGRAM

## 2024-03-11 PROCEDURE — 3008F BODY MASS INDEX DOCD: CPT | Performed by: STUDENT IN AN ORGANIZED HEALTH CARE EDUCATION/TRAINING PROGRAM

## 2024-03-11 PROCEDURE — 99205 OFFICE O/P NEW HI 60 MIN: CPT | Performed by: STUDENT IN AN ORGANIZED HEALTH CARE EDUCATION/TRAINING PROGRAM

## 2024-03-11 PROCEDURE — 3075F SYST BP GE 130 - 139MM HG: CPT | Performed by: STUDENT IN AN ORGANIZED HEALTH CARE EDUCATION/TRAINING PROGRAM

## 2024-03-11 RX ORDER — SODIUM, POTASSIUM,MAG SULFATES 17.5-3.13G
1 SOLUTION, RECONSTITUTED, ORAL ORAL SEE ADMIN INSTRUCTIONS
Qty: 2 EACH | Refills: 0 | Status: SHIPPED | OUTPATIENT
Start: 2024-03-11 | End: 2024-06-10 | Stop reason: ALTCHOICE

## 2024-03-11 NOTE — PROGRESS NOTES
Subjective     History of Present Illness:   Les Whitney is a 33 y.o. male with hx of obesity and family hx of colon cancer in his mother (Age 40) who presents to clinic for consultation regarding colon cancer screening as well as fatty liver.    Patient has had fatty liver demonstrated on cross-sectional imaging dating back to 2020.  He states he has not had a drop of alcohol since 2020 as it did not fit in his lifestyle anymore.  He had mild elevation in his ALT and AST the since 2020 which is persistent as recently as December of this last year.  He has lost about 50 pounds in the past 3 months while being on semaglutide as well as diet and exercise.  He denies any abdominal pain, nausea, hematochezia, melena.    His last colonoscopy was in 2015 and was unremarkable.  He was unaware that he needed screening for colon cancer every 5 years.    He has no family history of liver pathology.      Past Medical History   has a past medical history of Anxiety, Eczema, Hypertension, and Other specified health status (02/06/2020).     Social History   reports that he has never smoked. He has never used smokeless tobacco. He reports that he does not currently use alcohol after a past usage of about 1.0 standard drink of alcohol per week. He reports current drug use. Frequency: 1.00 time per week. Drug: Marijuana.     Family History  family history includes Breast cancer in his mother; Cancer in his mother; Colon cancer in his mother; Diabetes in his father.     Allergies  Allergies   Allergen Reactions    Cefaclor Hives and Unknown    Other Unknown     Amoclan    Penicillins Hives and Unknown     Childhood?       Medications  Current Outpatient Medications   Medication Instructions    amLODIPine (Norvasc) 5 mg tablet TAKE 1 TABLET (5 MG) BY MOUTH ONCE DAILY.    apremilast (Otezla) 30 mg tablet Take one (1) tablet by mouth twice daily.    losartan-hydrochlorothiazide (Hyzaar) 100-25 mg tablet TAKE 1 TABLET BY MOUTH ONCE  DAILY.    pantoprazole (ProtoNix) 40 mg EC tablet TAKE 1 TABLET (40 MG) BY MOUTH ONCE DAILY.    PARoxetine (Paxil) 40 mg tablet TAKE 1 TABLET (40 MG) BY MOUTH ONCE DAILY.    potassium chloride CR (Klor-Con) 10 mEq ER tablet 10 mEq, oral, Daily, Do not crush, chew, or split.    semaglutide (RYBELSUS) 14 mg, oral, Daily        Objective   Visit Vitals  /76   Pulse 77      Physical Exam  Vitals reviewed.   Constitutional:       Appearance: Normal appearance.   HENT:      Head: Normocephalic.      Mouth/Throat:      Mouth: Mucous membranes are moist.   Cardiovascular:      Rate and Rhythm: Normal rate and regular rhythm.   Pulmonary:      Effort: Pulmonary effort is normal.      Breath sounds: Normal breath sounds.   Abdominal:      General: Abdomen is flat.   Neurological:      General: No focal deficit present.      Mental Status: He is alert.   Psychiatric:         Mood and Affect: Mood normal.         Judgment: Judgment normal.         Assessment/Plan   Les Whitney is a 33 y.o. male with hx of obesity and family hx of colon cancer in his mother (Age 40) who presents to clinic for consultation regarding colon cancer screening as well as fatty liver. Etiology of his fatty liver is likely due to SNOWDEN however with his young age will plan to rule other other etiologies of hepatic steatosis. Will obtain fibroscan as well as US abdomen to evaluate for interval change.     Will plan on colonoscopy for screening as he is high risk.    Counseled on weight loss of at least 10% body weight. Exercise of 30 minutes 3x weekly (moderate-high intensity). Discussed mediterranean diet, limiting red meat. Abstain completely from sugary drinks including Pop, sweetened tea, sweetened coffee drinks etc. Completely abstain from alcohol and tobacco. Discussed that evidence for Vitamin E supplementation is mixed and best data to reverse inflammation 2/2 to SNOWDEN is with diet and exercise.       Problem List Items Addressed This Visit        GERD (gastroesophageal reflux disease)    Class 3 severe obesity due to excess calories with serious comorbidity and body mass index (BMI) of 45.0 to 49.9 in adult (CMS/HCC) - Primary    Generalized anxiety disorder    YARIEL on CPAP    Metabolic syndrome     Other Visit Diagnoses       Transaminitis        Relevant Orders    Liver Elastography (Fibroscan) GI    US abdomen complete    Hepatitis B Surface Antibody    Hepatitis B Surface Antigen    Anti-Smooth Muscle Antibody    Anti-Mitochondrial Antibody    JOEL without Reflex PETE    IgG Subclasses (1, 2, 3, and 4)    Ceruloplasmin    Alpha-1-Antitrypsin    Hepatic Function Panel    Basic Metabolic Panel    Protime-INR    Hepatitis B Core Antibody, Total    Hepatitis A Antibody, Total    Hepatitis C Antibody    Tissue Transglutaminase IgA    IgA    Family hx of colon cancer        Relevant Orders    Colonoscopy Screening; High Risk Patient                   Magalie Adams MD         My final recommendations will be communicated back to the requesting physician by way of shared Medical record or letter to requesting physician via fax.

## 2024-03-14 ENCOUNTER — LAB (OUTPATIENT)
Dept: LAB | Facility: LAB | Age: 34
End: 2024-03-14
Payer: COMMERCIAL

## 2024-03-14 ENCOUNTER — PHARMACY VISIT (OUTPATIENT)
Dept: PHARMACY | Facility: CLINIC | Age: 34
End: 2024-03-14
Payer: COMMERCIAL

## 2024-03-14 DIAGNOSIS — R74.01 TRANSAMINITIS: ICD-10-CM

## 2024-03-14 LAB
A1AT SERPL NEPH-MCNC: 155 MG/DL (ref 84–218)
ALBUMIN SERPL BCP-MCNC: 4.4 G/DL (ref 3.4–5)
ALP SERPL-CCNC: 79 U/L (ref 33–120)
ALT SERPL W P-5'-P-CCNC: 30 U/L (ref 10–52)
ANION GAP SERPL CALC-SCNC: 15 MMOL/L (ref 10–20)
AST SERPL W P-5'-P-CCNC: 32 U/L (ref 9–39)
BILIRUB DIRECT SERPL-MCNC: 0.2 MG/DL (ref 0–0.3)
BILIRUB SERPL-MCNC: 1.2 MG/DL (ref 0–1.2)
BUN SERPL-MCNC: 16 MG/DL (ref 6–23)
CALCIUM SERPL-MCNC: 9.9 MG/DL (ref 8.6–10.6)
CHLORIDE SERPL-SCNC: 100 MMOL/L (ref 98–107)
CO2 SERPL-SCNC: 29 MMOL/L (ref 21–32)
CREAT SERPL-MCNC: 0.83 MG/DL (ref 0.5–1.3)
EGFRCR SERPLBLD CKD-EPI 2021: >90 ML/MIN/1.73M*2
GLUCOSE SERPL-MCNC: 83 MG/DL (ref 74–99)
HAV AB SER QL IA: REACTIVE
HBV CORE AB SER QL: NONREACTIVE
HBV SURFACE AB SER-ACNC: 3.8 MIU/ML
HBV SURFACE AG SERPL QL IA: NONREACTIVE
INR PPP: 1.1 (ref 0.9–1.1)
POTASSIUM SERPL-SCNC: 3.8 MMOL/L (ref 3.5–5.3)
PROT SERPL-MCNC: 7.5 G/DL (ref 6.4–8.2)
PROTHROMBIN TIME: 11.9 SECONDS (ref 9.8–12.8)
SODIUM SERPL-SCNC: 140 MMOL/L (ref 136–145)

## 2024-03-14 PROCEDURE — 82784 ASSAY IGA/IGD/IGG/IGM EACH: CPT

## 2024-03-14 PROCEDURE — 86015 ACTIN ANTIBODY EACH: CPT

## 2024-03-14 PROCEDURE — 82390 ASSAY OF CERULOPLASMIN: CPT

## 2024-03-14 PROCEDURE — 86704 HEP B CORE ANTIBODY TOTAL: CPT

## 2024-03-14 PROCEDURE — 86256 FLUORESCENT ANTIBODY TITER: CPT

## 2024-03-14 PROCEDURE — 36415 COLL VENOUS BLD VENIPUNCTURE: CPT

## 2024-03-14 PROCEDURE — 85610 PROTHROMBIN TIME: CPT

## 2024-03-14 PROCEDURE — 87340 HEPATITIS B SURFACE AG IA: CPT

## 2024-03-14 PROCEDURE — 86381 MITOCHONDRIAL ANTIBODY EACH: CPT

## 2024-03-14 PROCEDURE — 80053 COMPREHEN METABOLIC PANEL: CPT

## 2024-03-14 PROCEDURE — 82248 BILIRUBIN DIRECT: CPT

## 2024-03-14 PROCEDURE — 82103 ALPHA-1-ANTITRYPSIN TOTAL: CPT

## 2024-03-14 PROCEDURE — 86708 HEPATITIS A ANTIBODY: CPT

## 2024-03-14 PROCEDURE — 83516 IMMUNOASSAY NONANTIBODY: CPT

## 2024-03-14 PROCEDURE — 86706 HEP B SURFACE ANTIBODY: CPT

## 2024-03-14 PROCEDURE — 86038 ANTINUCLEAR ANTIBODIES: CPT

## 2024-03-14 PROCEDURE — 86803 HEPATITIS C AB TEST: CPT

## 2024-03-15 ENCOUNTER — HOSPITAL ENCOUNTER (OUTPATIENT)
Dept: RADIOLOGY | Facility: CLINIC | Age: 34
Discharge: HOME | End: 2024-03-15
Payer: COMMERCIAL

## 2024-03-15 DIAGNOSIS — R74.01 TRANSAMINITIS: ICD-10-CM

## 2024-03-15 LAB
ANA SER QL HEP2 SUBST: NEGATIVE
CERULOPLASMIN SERPL-MCNC: 34.7 MG/DL (ref 20–60)
HCV AB SER QL: NONREACTIVE
IGA SERPL-MCNC: 333 MG/DL (ref 70–400)
IGG SERPL-MCNC: 1580 MG/DL (ref 700–1600)
IGG1 SER-MCNC: 1020 MG/DL (ref 490–1140)
IGG2 SER-MCNC: 373 MG/DL (ref 150–640)
IGG3 SER-MCNC: 63 MG/DL (ref 11–85)
IGG4 SER-MCNC: 10 MG/DL (ref 3–200)
MITOCHONDRIA AB SER QL IF: NEGATIVE
TTG IGA SER IA-ACNC: <1 U/ML

## 2024-03-15 PROCEDURE — 76700 US EXAM ABDOM COMPLETE: CPT

## 2024-03-15 PROCEDURE — 76700 US EXAM ABDOM COMPLETE: CPT | Performed by: RADIOLOGY

## 2024-03-19 LAB — SMOOTH MUSCLE AB SER QL IF: ABNORMAL

## 2024-03-20 PROCEDURE — RXMED WILLOW AMBULATORY MEDICATION CHARGE

## 2024-03-25 ENCOUNTER — SPECIALTY PHARMACY (OUTPATIENT)
Dept: PHARMACY | Facility: CLINIC | Age: 34
End: 2024-03-25

## 2024-03-25 ENCOUNTER — TELEPHONE (OUTPATIENT)
Dept: GASTROENTEROLOGY | Facility: CLINIC | Age: 34
End: 2024-03-25
Payer: COMMERCIAL

## 2024-03-25 DIAGNOSIS — R74.01 TRANSAMINITIS: Primary | ICD-10-CM

## 2024-03-25 NOTE — TELEPHONE ENCOUNTER
Attempted to call patient to discuss results of ASMA resulting at 1:160. Of note, liver enzymes have normalized since patient weight loss.    At this time AIH remains on the differential for his deranged liver enzymes as this titer is not low.  In lieu of liver enzymes normalizing, however, unclear utility of initiation of liver biopsy and immunosuppressive therapy. Given this, we will plan to repeat liver testing in 3 months time and will discuss risk/benefits of liver biopsy at upcoming clinic visit.     Magalie Adams MD

## 2024-03-27 ENCOUNTER — PHARMACY VISIT (OUTPATIENT)
Dept: PHARMACY | Facility: CLINIC | Age: 34
End: 2024-03-27
Payer: COMMERCIAL

## 2024-04-03 DIAGNOSIS — I10 ESSENTIAL HYPERTENSION: ICD-10-CM

## 2024-04-03 DIAGNOSIS — K21.9 GASTROESOPHAGEAL REFLUX DISEASE WITHOUT ESOPHAGITIS: ICD-10-CM

## 2024-04-03 DIAGNOSIS — F41.9 ANXIETY: ICD-10-CM

## 2024-04-03 PROCEDURE — RXMED WILLOW AMBULATORY MEDICATION CHARGE

## 2024-04-06 ENCOUNTER — PHARMACY VISIT (OUTPATIENT)
Dept: PHARMACY | Facility: CLINIC | Age: 34
End: 2024-04-06
Payer: COMMERCIAL

## 2024-04-06 RX ORDER — AMLODIPINE BESYLATE 5 MG/1
5 TABLET ORAL
Qty: 90 TABLET | Refills: 1 | Status: SHIPPED | OUTPATIENT
Start: 2024-04-06 | End: 2025-04-06

## 2024-04-06 RX ORDER — PAROXETINE HYDROCHLORIDE 40 MG/1
40 TABLET, FILM COATED ORAL
Qty: 90 TABLET | Refills: 1 | Status: SHIPPED | OUTPATIENT
Start: 2024-04-06 | End: 2025-04-06

## 2024-04-06 RX ORDER — PANTOPRAZOLE SODIUM 40 MG/1
40 TABLET, DELAYED RELEASE ORAL
Qty: 90 TABLET | Refills: 1 | Status: SHIPPED | OUTPATIENT
Start: 2024-04-06 | End: 2025-04-06

## 2024-04-08 PROCEDURE — RXMED WILLOW AMBULATORY MEDICATION CHARGE

## 2024-04-09 ENCOUNTER — PHARMACY VISIT (OUTPATIENT)
Dept: PHARMACY | Facility: CLINIC | Age: 34
End: 2024-04-09
Payer: COMMERCIAL

## 2024-04-18 ENCOUNTER — DOCUMENTATION (OUTPATIENT)
Dept: DERMATOLOGY | Facility: CLINIC | Age: 34
End: 2024-04-18
Payer: COMMERCIAL

## 2024-04-18 NOTE — PROGRESS NOTES
PA approved for Otezla.   **Your prior authorization has been approved. Message from plan: Your request has been approved. Authorization Expiration Date: April 17, 2025.  Financial support for Otezla® (apremilast) may be available for commercial patients through the 's Copay Card program. Terms and conditions apply. ** copied from [facility]

## 2024-04-24 ENCOUNTER — SPECIALTY PHARMACY (OUTPATIENT)
Dept: PHARMACY | Facility: CLINIC | Age: 34
End: 2024-04-24

## 2024-04-26 ENCOUNTER — APPOINTMENT (OUTPATIENT)
Dept: DERMATOLOGY | Facility: CLINIC | Age: 34
End: 2024-04-26
Payer: COMMERCIAL

## 2024-04-26 ENCOUNTER — LAB (OUTPATIENT)
Dept: LAB | Facility: LAB | Age: 34
End: 2024-04-26
Payer: COMMERCIAL

## 2024-05-09 RX ORDER — ONDANSETRON HYDROCHLORIDE 2 MG/ML
4 INJECTION, SOLUTION INTRAVENOUS ONCE AS NEEDED
Status: CANCELLED | OUTPATIENT
Start: 2024-05-09

## 2024-05-10 ENCOUNTER — ANESTHESIA EVENT (OUTPATIENT)
Dept: GASTROENTEROLOGY | Facility: HOSPITAL | Age: 34
End: 2024-05-10
Payer: COMMERCIAL

## 2024-05-10 ENCOUNTER — ANESTHESIA (OUTPATIENT)
Dept: GASTROENTEROLOGY | Facility: HOSPITAL | Age: 34
End: 2024-05-10
Payer: COMMERCIAL

## 2024-05-10 ENCOUNTER — HOSPITAL ENCOUNTER (OUTPATIENT)
Dept: GASTROENTEROLOGY | Facility: HOSPITAL | Age: 34
Setting detail: OUTPATIENT SURGERY
Discharge: HOME | End: 2024-05-10
Payer: COMMERCIAL

## 2024-05-10 VITALS
WEIGHT: 285 LBS | TEMPERATURE: 97 F | HEIGHT: 70 IN | HEART RATE: 73 BPM | BODY MASS INDEX: 40.8 KG/M2 | DIASTOLIC BLOOD PRESSURE: 64 MMHG | OXYGEN SATURATION: 97 % | RESPIRATION RATE: 18 BRPM | SYSTOLIC BLOOD PRESSURE: 134 MMHG

## 2024-05-10 DIAGNOSIS — Z80.0 FAMILY HX OF COLON CANCER: ICD-10-CM

## 2024-05-10 PROCEDURE — G0121 COLON CA SCRN NOT HI RSK IND: HCPCS | Performed by: STUDENT IN AN ORGANIZED HEALTH CARE EDUCATION/TRAINING PROGRAM

## 2024-05-10 PROCEDURE — A45378 PR COLONOSCOPY,DIAGNOSTIC: Performed by: ANESTHESIOLOGIST ASSISTANT

## 2024-05-10 PROCEDURE — 7100000009 HC PHASE TWO TIME - INITIAL BASE CHARGE: Performed by: STUDENT IN AN ORGANIZED HEALTH CARE EDUCATION/TRAINING PROGRAM

## 2024-05-10 PROCEDURE — A45378 PR COLONOSCOPY,DIAGNOSTIC: Performed by: STUDENT IN AN ORGANIZED HEALTH CARE EDUCATION/TRAINING PROGRAM

## 2024-05-10 PROCEDURE — 2500000004 HC RX 250 GENERAL PHARMACY W/ HCPCS (ALT 636 FOR OP/ED): Performed by: ANESTHESIOLOGIST ASSISTANT

## 2024-05-10 PROCEDURE — 2500000001 HC RX 250 WO HCPCS SELF ADMINISTERED DRUGS (ALT 637 FOR MEDICARE OP): Performed by: STUDENT IN AN ORGANIZED HEALTH CARE EDUCATION/TRAINING PROGRAM

## 2024-05-10 PROCEDURE — 7100000010 HC PHASE TWO TIME - EACH INCREMENTAL 1 MINUTE: Performed by: STUDENT IN AN ORGANIZED HEALTH CARE EDUCATION/TRAINING PROGRAM

## 2024-05-10 PROCEDURE — 3700000002 HC GENERAL ANESTHESIA TIME - EACH INCREMENTAL 1 MINUTE: Performed by: STUDENT IN AN ORGANIZED HEALTH CARE EDUCATION/TRAINING PROGRAM

## 2024-05-10 PROCEDURE — 2500000004 HC RX 250 GENERAL PHARMACY W/ HCPCS (ALT 636 FOR OP/ED): Performed by: STUDENT IN AN ORGANIZED HEALTH CARE EDUCATION/TRAINING PROGRAM

## 2024-05-10 PROCEDURE — 3700000001 HC GENERAL ANESTHESIA TIME - INITIAL BASE CHARGE: Performed by: STUDENT IN AN ORGANIZED HEALTH CARE EDUCATION/TRAINING PROGRAM

## 2024-05-10 PROCEDURE — 45378 DIAGNOSTIC COLONOSCOPY: CPT | Performed by: STUDENT IN AN ORGANIZED HEALTH CARE EDUCATION/TRAINING PROGRAM

## 2024-05-10 RX ORDER — SODIUM CHLORIDE, SODIUM LACTATE, POTASSIUM CHLORIDE, CALCIUM CHLORIDE 600; 310; 30; 20 MG/100ML; MG/100ML; MG/100ML; MG/100ML
100 INJECTION, SOLUTION INTRAVENOUS CONTINUOUS
OUTPATIENT
Start: 2024-05-10

## 2024-05-10 RX ORDER — OXYCODONE HYDROCHLORIDE 5 MG/1
5 TABLET ORAL EVERY 4 HOURS PRN
OUTPATIENT
Start: 2024-05-10

## 2024-05-10 RX ORDER — MEPERIDINE HYDROCHLORIDE 50 MG/ML
12.5 INJECTION INTRAMUSCULAR; INTRAVENOUS; SUBCUTANEOUS EVERY 10 MIN PRN
OUTPATIENT
Start: 2024-05-10

## 2024-05-10 RX ORDER — ALBUTEROL SULFATE 0.83 MG/ML
2.5 SOLUTION RESPIRATORY (INHALATION) ONCE AS NEEDED
OUTPATIENT
Start: 2024-05-10

## 2024-05-10 RX ORDER — LIDOCAINE HYDROCHLORIDE 10 MG/ML
0.1 INJECTION INFILTRATION; PERINEURAL ONCE
OUTPATIENT
Start: 2024-05-10 | End: 2024-05-10

## 2024-05-10 RX ORDER — SODIUM CHLORIDE, SODIUM LACTATE, POTASSIUM CHLORIDE, CALCIUM CHLORIDE 600; 310; 30; 20 MG/100ML; MG/100ML; MG/100ML; MG/100ML
20 INJECTION, SOLUTION INTRAVENOUS CONTINUOUS
Status: DISCONTINUED | OUTPATIENT
Start: 2024-05-10 | End: 2024-05-11 | Stop reason: HOSPADM

## 2024-05-10 RX ORDER — DEXTROMETHORPHAN/PSEUDOEPHED 2.5-7.5/.8
DROPS ORAL AS NEEDED
Status: COMPLETED | OUTPATIENT
Start: 2024-05-10 | End: 2024-05-10

## 2024-05-10 RX ORDER — LABETALOL HYDROCHLORIDE 5 MG/ML
5 INJECTION, SOLUTION INTRAVENOUS ONCE AS NEEDED
OUTPATIENT
Start: 2024-05-10

## 2024-05-10 RX ORDER — ONDANSETRON HYDROCHLORIDE 2 MG/ML
4 INJECTION, SOLUTION INTRAVENOUS ONCE AS NEEDED
OUTPATIENT
Start: 2024-05-10

## 2024-05-10 RX ORDER — PROPOFOL 10 MG/ML
INJECTION, EMULSION INTRAVENOUS AS NEEDED
Status: DISCONTINUED | OUTPATIENT
Start: 2024-05-10 | End: 2024-05-10

## 2024-05-10 RX ORDER — FENTANYL CITRATE 50 UG/ML
25 INJECTION, SOLUTION INTRAMUSCULAR; INTRAVENOUS EVERY 5 MIN PRN
OUTPATIENT
Start: 2024-05-10

## 2024-05-10 RX ADMIN — PROPOFOL 30 MG: 10 INJECTION, EMULSION INTRAVENOUS at 10:49

## 2024-05-10 RX ADMIN — PROPOFOL 50 MG: 10 INJECTION, EMULSION INTRAVENOUS at 10:39

## 2024-05-10 RX ADMIN — PROPOFOL 30 MG: 10 INJECTION, EMULSION INTRAVENOUS at 10:42

## 2024-05-10 RX ADMIN — SODIUM CHLORIDE, POTASSIUM CHLORIDE, SODIUM LACTATE AND CALCIUM CHLORIDE: 600; 310; 30; 20 INJECTION, SOLUTION INTRAVENOUS at 10:32

## 2024-05-10 RX ADMIN — PROPOFOL 40 MG: 10 INJECTION, EMULSION INTRAVENOUS at 10:46

## 2024-05-10 RX ADMIN — PROPOFOL 100 MG: 10 INJECTION, EMULSION INTRAVENOUS at 10:36

## 2024-05-10 RX ADMIN — PROPOFOL 10 MG: 10 INJECTION, EMULSION INTRAVENOUS at 10:41

## 2024-05-10 RX ADMIN — PROPOFOL 10 MG: 10 INJECTION, EMULSION INTRAVENOUS at 10:44

## 2024-05-10 RX ADMIN — PROPOFOL 30 MG: 10 INJECTION, EMULSION INTRAVENOUS at 10:38

## 2024-05-10 RX ADMIN — SIMETHICONE 333 MG: 20 EMULSION ORAL at 10:50

## 2024-05-10 SDOH — HEALTH STABILITY: MENTAL HEALTH: CURRENT SMOKER: 0

## 2024-05-10 ASSESSMENT — PAIN SCALES - GENERAL
PAINLEVEL_OUTOF10: 0 - NO PAIN
PAIN_LEVEL: 0

## 2024-05-10 ASSESSMENT — PAIN - FUNCTIONAL ASSESSMENT
PAIN_FUNCTIONAL_ASSESSMENT: 0-10

## 2024-05-10 NOTE — H&P
Procedure H&P    Patient Profile-Procedures  Name Les Whitney  Date of Birth 1990  MRN [id number]  Address   [address] [address] [id number] [facility] [address]    Primary Phone Number [phone]  Secondary Phone Number    Gibran العلي    Procedure(s):  Procedures: Colonoscopy  Primary contact name and number   Extended Emergency Contact Information  Primary Emergency Contact: Eboni Whitney  Home Phone: [phone]  Relation: Spouse    General Health  Weight   Vitals:    05/10/24 0910   Weight: 129 kg (285 lb)     BMI Body mass index is 40.89 kg/m².    Allergies  Allergies   Allergen Reactions    Cefaclor Hives and Unknown    Other Unknown     Amoclan    Penicillins Hives and Unknown     Childhood?       Past Medical History   Past Medical History:   Diagnosis Date    Anxiety     Eczema     Hypertension     Other specified health status 02/06/2020    No pertinent past medical history       Provider assessment  Diagnosis: Colon Cancer Screening/Surveillance   Medication Reviewed - yes  Prior to Admission medications    Medication Sig Start Date End Date Taking? Authorizing Provider   amLODIPine (Norvasc) 5 mg tablet TAKE 1 TABLET (5 MG) BY MOUTH ONCE DAILY. 4/6/24 4/6/25 Yes Gibran Espinosa MD   apremilast (Otezla) 30 mg tablet Take one (1) tablet by mouth twice daily. 10/20/23  Yes Monica Rowe, DO   losartan-hydrochlorothiazide (Hyzaar) 100-25 mg tablet TAKE 1 TABLET BY MOUTH ONCE DAILY. 10/5/23 9/29/24 Yes Lv Guerra, DO   pantoprazole (ProtoNix) 40 mg EC tablet TAKE 1 TABLET (40 MG) BY MOUTH ONCE DAILY. 4/6/24 4/6/25 Yes Gibran Espinosa MD   PARoxetine (Paxil) 40 mg tablet TAKE 1 TABLET (40 MG) BY MOUTH ONCE DAILY. 4/6/24 4/6/25 Yes Gibran Espinosa MD   potassium chloride CR (Klor-Con) 10 mEq ER tablet Take 1 tablet (10 mEq) by mouth once daily. Do not crush, chew, or split. 1/2/24 1/1/25 Yes Gibran Espinosa MD   sodium,potassium,mag  sulfates (Suprep) 17.5-3.13-1.6 gram recon soln solution Take 2 bottles by mouth see administration instructions. 3/11/24  Yes Magalie Adams MD   semaglutide (Rybelsus) 14 mg tablet tablet Take 1 tablet (14 mg) by mouth once daily.  Patient not taking: Reported on 5/10/2024 6/8/23   Gibran Espinosa MD       Physical Exam  Vitals:    05/10/24 0910   BP: 123/65   Pulse: 84   Resp: 18   Temp: 36 °C (96.8 °F)   SpO2: 97%        General: A&Ox3, NAD.  HEENT: AT/NC.   CV: RRR. No murmur.  Resp: CTA bilaterally. No wheezing, rhonchi or rales.   GI: Soft, NT/ND. BSx4.  Extrem: No edema. Pulses intact.  Neuro: No focal deficits.   Psych: Normal mood and affect.      Procedure Plan - pre-procedural (re)assesment completed by physician:  discharge/transfer patient when discharge criteria met    Magalie Adams MD  5/10/2024 10:17 AM

## 2024-05-10 NOTE — ANESTHESIA PREPROCEDURE EVALUATION
Patient: Les Whitney    Procedure Information       Date/Time: 05/10/24 1020    Scheduled providers: Magalie dAams MD    Procedure: COLONOSCOPY    Location: [facility]            Relevant Problems   Cardiac   (+) Essential hypertension      Pulmonary   (+) YARIEL on CPAP      Neuro   (+) Anxiety   (+) Generalized anxiety disorder      GI   (+) GERD (gastroesophageal reflux disease)      Endocrine   (+) Class 3 severe obesity due to excess calories with serious comorbidity and body mass index (BMI) of 45.0 to 49.9 in adult (Multi)      HEENT   (+) Chronic sinusitis      ID   (+) Tinea corporis      Skin   (+) Eczema   (+) Guttate psoriasis       Clinical information reviewed:   Tobacco  Allergies  Meds   Med Hx  Surg Hx   Fam Hx  Soc Hx        NPO Detail:  NPO/Void Status  Carbohydrate Drink Given Prior to Surgery? : N  Date of Last Liquid: 05/10/24  Time of Last Liquid: 0300  Date of Last Solid: 05/08/24  Time of Last Solid: 2230  Time of Last Void: 0330         Physical Exam    Airway  Mallampati: II  TM distance: >3 FB  Neck ROM: full     Cardiovascular - normal exam  Rhythm: regular  Rate: normal     Dental - normal exam     Pulmonary - normal exam     Abdominal - normal exam  Abdomen: soft             Anesthesia Plan    History of general anesthesia?: yes  History of complications of general anesthesia?: no    ASA 3     general     The patient is not a current smoker.  Patient was previously instructed to abstain from smoking on day of procedure.  Patient did not smoke on day of procedure.  Education provided regarding risk of obstructive sleep apnea.  intravenous induction   Postoperative administration of opioids is intended.  Anesthetic plan and risks discussed with patient.    Plan discussed with CAA.

## 2024-05-10 NOTE — ANESTHESIA POSTPROCEDURE EVALUATION
Patient: Les Whitney    Procedure Summary       Date: 05/10/24 Room / Location: [facility]    Anesthesia Start: 1032 Anesthesia Stop: 1100    Procedure: COLONOSCOPY Diagnosis: Family hx of colon cancer    Scheduled Providers: Magalie Adams MD Responsible Provider: Sourav Morocho DO    Anesthesia Type: general ASA Status: 3            Anesthesia Type: general    Vitals Value Taken Time   /65 05/10/24 1106   Temp 36.6 05/10/24 1106   Pulse 70 05/10/24 1106   Resp 12 05/10/24 1106   SpO2 98 05/10/24 1106       Anesthesia Post Evaluation    Patient location during evaluation: PACU  Patient participation: complete - patient cannot participate  Level of consciousness: sleepy but conscious  Pain score: 0  Pain management: adequate  There was medical reason for not using a multimodal analgesia pain management approach.Airway patency: patent  Two or more strategies used to mitigate risk of obstructive sleep apnea  Cardiovascular status: acceptable and stable  Respiratory status: acceptable and room air  Hydration status: acceptable  Postoperative Nausea and Vomiting: none        No notable events documented.

## 2024-06-01 ENCOUNTER — LAB (OUTPATIENT)
Dept: LAB | Facility: LAB | Age: 34
End: 2024-06-01
Payer: COMMERCIAL

## 2024-06-01 DIAGNOSIS — R74.01 TRANSAMINITIS: ICD-10-CM

## 2024-06-01 PROCEDURE — 36415 COLL VENOUS BLD VENIPUNCTURE: CPT

## 2024-06-01 PROCEDURE — 80076 HEPATIC FUNCTION PANEL: CPT

## 2024-06-02 LAB
ALBUMIN SERPL BCP-MCNC: 4.4 G/DL (ref 3.4–5)
ALP SERPL-CCNC: 57 U/L (ref 33–120)
ALT SERPL W P-5'-P-CCNC: 39 U/L (ref 10–52)
AST SERPL W P-5'-P-CCNC: 25 U/L (ref 9–39)
BILIRUB DIRECT SERPL-MCNC: 0.1 MG/DL (ref 0–0.3)
BILIRUB SERPL-MCNC: 0.9 MG/DL (ref 0–1.2)
PROT SERPL-MCNC: 7.4 G/DL (ref 6.4–8.2)

## 2024-06-03 ENCOUNTER — SPECIALTY PHARMACY (OUTPATIENT)
Dept: PHARMACY | Facility: CLINIC | Age: 34
End: 2024-06-03

## 2024-06-10 ENCOUNTER — TELEMEDICINE CLINICAL SUPPORT (OUTPATIENT)
Dept: PHARMACY | Facility: HOSPITAL | Age: 34
End: 2024-06-10

## 2024-06-10 ENCOUNTER — OFFICE VISIT (OUTPATIENT)
Dept: GASTROENTEROLOGY | Facility: CLINIC | Age: 34
End: 2024-06-10
Payer: COMMERCIAL

## 2024-06-10 VITALS
HEART RATE: 68 BPM | DIASTOLIC BLOOD PRESSURE: 80 MMHG | HEIGHT: 70 IN | WEIGHT: 298.4 LBS | OXYGEN SATURATION: 98 % | BODY MASS INDEX: 42.72 KG/M2 | SYSTOLIC BLOOD PRESSURE: 130 MMHG | RESPIRATION RATE: 18 BRPM

## 2024-06-10 DIAGNOSIS — K21.9 GASTROESOPHAGEAL REFLUX DISEASE WITHOUT ESOPHAGITIS: ICD-10-CM

## 2024-06-10 DIAGNOSIS — R74.01 TRANSAMINITIS: Primary | ICD-10-CM

## 2024-06-10 DIAGNOSIS — G47.33 OSA ON CPAP: ICD-10-CM

## 2024-06-10 DIAGNOSIS — L40.0 PSORIASIS VULGARIS: ICD-10-CM

## 2024-06-10 PROCEDURE — 3075F SYST BP GE 130 - 139MM HG: CPT | Performed by: STUDENT IN AN ORGANIZED HEALTH CARE EDUCATION/TRAINING PROGRAM

## 2024-06-10 PROCEDURE — 99214 OFFICE O/P EST MOD 30 MIN: CPT | Performed by: STUDENT IN AN ORGANIZED HEALTH CARE EDUCATION/TRAINING PROGRAM

## 2024-06-10 PROCEDURE — 1036F TOBACCO NON-USER: CPT | Performed by: STUDENT IN AN ORGANIZED HEALTH CARE EDUCATION/TRAINING PROGRAM

## 2024-06-10 PROCEDURE — 3008F BODY MASS INDEX DOCD: CPT | Performed by: STUDENT IN AN ORGANIZED HEALTH CARE EDUCATION/TRAINING PROGRAM

## 2024-06-10 PROCEDURE — 3079F DIAST BP 80-89 MM HG: CPT | Performed by: STUDENT IN AN ORGANIZED HEALTH CARE EDUCATION/TRAINING PROGRAM

## 2024-06-10 NOTE — PROGRESS NOTES
Subjective     History of Present Illness:   Les Whitney is a 34 y.o. male with hx of obesity and family hx of colon cancer in his mother (Age 40) who presents to clinic for follow up regarding colon cancer screening as well as fatty liver.    Since last visit he completed colonoscopy that was normal. Liver workup revealed ASMA of 1:160. His liver enzymes have normalized since his weight loss including most recent eval 06/01/2024.      With regards to his hx,  Patient has had fatty liver demonstrated on cross-sectional imaging dating back to 2020.  He states he has not had a drop of alcohol since 2020 as it did not fit in his lifestyle anymore.  He had mild elevation in his ALT and AST the since 2020 which is persistent as recently as December of this last year.  He has lost about 50 pounds in the past 3 months while being on semaglutide as well as diet and exercise.  He denies any abdominal pain, nausea, hematochezia, melena.    His last colonoscopy was in 2015 and was unremarkable.  He was unaware that he needed screening for colon cancer every 5 years.    He has no family history of liver pathology.      Past Medical History   has a past medical history of Anxiety, Eczema, Hypertension, and Other specified health status (02/06/2020).     Social History   reports that he has never smoked. He has never used smokeless tobacco. He reports that he does not currently use alcohol after a past usage of about 1.0 standard drink of alcohol per week. He reports current drug use. Frequency: 1.00 time per week. Drug: Marijuana.     Family History  family history includes Breast cancer in his mother; Cancer in his mother; Colon cancer in his mother; Diabetes in his father.     Allergies  Allergies   Allergen Reactions    Cefaclor Hives and Unknown    Other Unknown     Amoclan    Penicillins Hives and Unknown     Childhood?       Medications  Current Outpatient Medications   Medication Instructions    amLODIPine (Norvasc) 5  mg tablet TAKE 1 TABLET (5 MG) BY MOUTH ONCE DAILY.    apremilast (Otezla) 30 mg tablet Take one (1) tablet by mouth twice daily.    losartan-hydrochlorothiazide (Hyzaar) 100-25 mg tablet TAKE 1 TABLET BY MOUTH ONCE DAILY.    pantoprazole (ProtoNix) 40 mg EC tablet TAKE 1 TABLET (40 MG) BY MOUTH ONCE DAILY.    PARoxetine (Paxil) 40 mg tablet TAKE 1 TABLET (40 MG) BY MOUTH ONCE DAILY.    potassium chloride CR (Klor-Con) 10 mEq ER tablet 10 mEq, oral, Daily, Do not crush, chew, or split.    semaglutide (RYBELSUS) 14 mg, oral, Daily        Objective   Visit Vitals  /80 (BP Location: Right arm, Patient Position: Sitting, BP Cuff Size: Large adult)   Pulse 68   Resp 18        Physical Exam  Vitals reviewed.   Constitutional:       Appearance: Normal appearance.   HENT:      Head: Normocephalic.      Mouth/Throat:      Mouth: Mucous membranes are moist.   Cardiovascular:      Rate and Rhythm: Normal rate and regular rhythm.   Pulmonary:      Effort: Pulmonary effort is normal.      Breath sounds: Normal breath sounds.   Abdominal:      General: Abdomen is flat.   Neurological:      General: No focal deficit present.      Mental Status: He is alert.   Psychiatric:         Mood and Affect: Mood normal.         Judgment: Judgment normal.         Assessment/Plan   Les Whitney is a 34 y.o. male with hx of obesity and family hx of colon cancer in his mother (Age 40) who presents to clinic for follow up regarding colon cancer screening as well as fatty liver with deranged liver enzymes.     Chronic liver disease workup revealed + ASMA to 1:160. In setting of negative JOEL, normalization of liver enzymes with weight loss, low suspicion for AIH and I believe risks of liver biopsy outweigh benefits at this time. Will plan to survey liver enzymes q6 months.     Discussed need to obtain hep B vaccine with PCP.     He will return to GI clinic with any worsening of his GERD. Continue PPI PO daily.         Problem List Items  Addressed This Visit       GERD (gastroesophageal reflux disease)    YARIEL on CPAP     Other Visit Diagnoses       Transaminitis    -  Primary    Relevant Orders    Anti-Smooth Muscle Antibody    TPMT Genotyping                     Magalie Adams MD         My final recommendations will be communicated back to the requesting physician by way of shared Medical record or letter to requesting physician via fax.

## 2024-06-10 NOTE — PROGRESS NOTES
[facility] Clinical Note    Les Whitney is a 34 y.o. male, who is on the specialty pharmacy service for management of: Dermatology Core with status of: (Enrolled)     Les was contacted on 6/10/2024 at 2:09 PM for a virtual pharmacy visit with encounter number [id number] from the [id number]  [id number]    [facility] [address]  Dept: [phone]  Dept Fax: [phone]  Loc: [phone]    Les was offered a Telemedicine Video visit or Telephone visit.  Les consented to a telephone visit, which was performed.    Les is taking: Otezla.   The most recent encounter visit with the referring prescriber Dr. Monica Rowe on 10/20/2023 was reviewed.  Pharmacy will continue to collaborate in the care of this patient with the referring prescriber Dr. Monica Rowe.    General Assessment       Impression/Plan  IMPRESSION/PLAN:  Is patient high risk (potential patients:  pregnancy, geriatric, pediatric)?  no  Is laboratory follow-up needed? no  Is a clinical intervention needed? Needs refill, added back to refill task list  Next reassessment date? 6 months   Additional comments:     Refer to the encounter summary report for documentation details about patient counseling and education.      Medication Adherence  The importance of adherence was discussed with the patient and they were advised to take the medication as prescribed by their provider. Les was encouraged to call his physician's office if they have a question regarding a missed dose.        Patient advised to contact the pharmacy if there are any changes to her medication list, including prescriptions, OTC medications, herbal products, or supplements. Patient was advised of [facility]’s dispensing process, refill timeline, contact information ([phone]), and patient management follow up. Patient confirmed  understanding of education conducted during assessment. All patient questions and concerns were addressed to the best of my ability. Patient was encouraged to contact the specialty pharmacy with any questions or concerns.    Confirmed follow-up outreaches are properly scheduled. Reviewed goals of therapy in the program targets.    Ishan Durant, PharmD

## 2024-06-12 ENCOUNTER — SPECIALTY PHARMACY (OUTPATIENT)
Dept: PHARMACY | Facility: CLINIC | Age: 34
End: 2024-06-12

## 2024-06-12 PROCEDURE — RXMED WILLOW AMBULATORY MEDICATION CHARGE

## 2024-06-19 ENCOUNTER — TELEPHONE (OUTPATIENT)
Dept: GASTROENTEROLOGY | Facility: CLINIC | Age: 34
End: 2024-06-19
Payer: COMMERCIAL

## 2024-06-27 ENCOUNTER — PHARMACY VISIT (OUTPATIENT)
Dept: PHARMACY | Facility: CLINIC | Age: 34
End: 2024-06-27
Payer: COMMERCIAL

## 2024-07-01 ENCOUNTER — APPOINTMENT (OUTPATIENT)
Dept: PRIMARY CARE | Facility: CLINIC | Age: 34
End: 2024-07-01
Payer: COMMERCIAL

## 2024-07-02 PROCEDURE — RXMED WILLOW AMBULATORY MEDICATION CHARGE

## 2024-07-03 ENCOUNTER — APPOINTMENT (OUTPATIENT)
Dept: PRIMARY CARE | Facility: CLINIC | Age: 34
End: 2024-07-03
Payer: COMMERCIAL

## 2024-07-10 ENCOUNTER — PHARMACY VISIT (OUTPATIENT)
Dept: PHARMACY | Facility: CLINIC | Age: 34
End: 2024-07-10
Payer: COMMERCIAL

## 2024-07-19 ENCOUNTER — SPECIALTY PHARMACY (OUTPATIENT)
Dept: PHARMACY | Facility: CLINIC | Age: 34
End: 2024-07-19

## 2024-07-19 DIAGNOSIS — L40.0 PSORIASIS VULGARIS: ICD-10-CM

## 2024-07-19 RX ORDER — APREMILAST 30 MG/1
TABLET, FILM COATED ORAL
Qty: 60 TABLET | Refills: 0 | Status: SHIPPED | OUTPATIENT
Start: 2024-07-19

## 2024-07-26 ENCOUNTER — APPOINTMENT (OUTPATIENT)
Dept: DERMATOLOGY | Facility: CLINIC | Age: 34
End: 2024-07-26
Payer: COMMERCIAL

## 2024-08-05 ENCOUNTER — SPECIALTY PHARMACY (OUTPATIENT)
Dept: PHARMACY | Facility: CLINIC | Age: 34
End: 2024-08-05

## 2024-08-18 ENCOUNTER — SPECIALTY PHARMACY (OUTPATIENT)
Dept: PHARMACY | Facility: CLINIC | Age: 34
End: 2024-08-18

## 2024-08-22 PROCEDURE — RXMED WILLOW AMBULATORY MEDICATION CHARGE

## 2024-08-26 ENCOUNTER — SPECIALTY PHARMACY (OUTPATIENT)
Dept: PHARMACY | Facility: CLINIC | Age: 34
End: 2024-08-26

## 2024-08-29 ENCOUNTER — PHARMACY VISIT (OUTPATIENT)
Dept: PHARMACY | Facility: CLINIC | Age: 34
End: 2024-08-29
Payer: COMMERCIAL

## 2024-09-10 ENCOUNTER — SPECIALTY PHARMACY (OUTPATIENT)
Dept: PHARMACY | Facility: CLINIC | Age: 34
End: 2024-09-10

## 2024-09-10 DIAGNOSIS — L40.0 PSORIASIS VULGARIS: ICD-10-CM

## 2024-09-10 RX ORDER — APREMILAST 30 MG/1
TABLET, FILM COATED ORAL
Qty: 60 TABLET | Refills: 0 | Status: SHIPPED | OUTPATIENT
Start: 2024-09-10

## 2024-09-21 PROCEDURE — RXMED WILLOW AMBULATORY MEDICATION CHARGE

## 2024-09-22 ENCOUNTER — SPECIALTY PHARMACY (OUTPATIENT)
Dept: PHARMACY | Facility: CLINIC | Age: 34
End: 2024-09-22

## 2024-09-23 DIAGNOSIS — E88.810 INSULIN RESISTANCE SYNDROME: ICD-10-CM

## 2024-09-24 ENCOUNTER — PHARMACY VISIT (OUTPATIENT)
Dept: PHARMACY | Facility: CLINIC | Age: 34
End: 2024-09-24
Payer: COMMERCIAL

## 2024-10-02 DIAGNOSIS — I10 ESSENTIAL HYPERTENSION: ICD-10-CM

## 2024-10-02 DIAGNOSIS — F41.9 ANXIETY: ICD-10-CM

## 2024-10-02 DIAGNOSIS — K21.9 GASTROESOPHAGEAL REFLUX DISEASE WITHOUT ESOPHAGITIS: ICD-10-CM

## 2024-10-02 PROCEDURE — RXMED WILLOW AMBULATORY MEDICATION CHARGE

## 2024-10-02 RX ORDER — AMLODIPINE BESYLATE 5 MG/1
5 TABLET ORAL
Qty: 90 TABLET | Refills: 3 | Status: SHIPPED | OUTPATIENT
Start: 2024-10-02 | End: 2025-10-02

## 2024-10-02 RX ORDER — PANTOPRAZOLE SODIUM 40 MG/1
40 TABLET, DELAYED RELEASE ORAL
Qty: 90 TABLET | Refills: 3 | Status: SHIPPED | OUTPATIENT
Start: 2024-10-02 | End: 2025-10-02

## 2024-10-02 RX ORDER — PANTOPRAZOLE SODIUM 40 MG/1
40 TABLET, DELAYED RELEASE ORAL
Qty: 90 TABLET | Refills: 1 | Status: CANCELLED | OUTPATIENT
Start: 2024-10-02 | End: 2025-10-02

## 2024-10-02 RX ORDER — PAROXETINE HYDROCHLORIDE 40 MG/1
40 TABLET, FILM COATED ORAL
Qty: 90 TABLET | Refills: 3 | Status: SHIPPED | OUTPATIENT
Start: 2024-10-02 | End: 2025-10-02

## 2024-10-02 RX ORDER — LOSARTAN POTASSIUM AND HYDROCHLOROTHIAZIDE 25; 100 MG/1; MG/1
1 TABLET ORAL DAILY
Qty: 90 TABLET | Refills: 0 | Status: SHIPPED | OUTPATIENT
Start: 2024-10-02 | End: 2025-01-01

## 2024-10-02 RX ORDER — LOSARTAN POTASSIUM AND HYDROCHLOROTHIAZIDE 25; 100 MG/1; MG/1
1 TABLET ORAL DAILY
Qty: 90 TABLET | Refills: 3 | Status: CANCELLED | OUTPATIENT
Start: 2024-10-02 | End: 2025-09-27

## 2024-10-02 RX ORDER — PAROXETINE HYDROCHLORIDE 40 MG/1
40 TABLET, FILM COATED ORAL
Qty: 90 TABLET | Refills: 1 | Status: CANCELLED | OUTPATIENT
Start: 2024-10-02 | End: 2025-10-02

## 2024-10-02 RX ORDER — AMLODIPINE BESYLATE 5 MG/1
5 TABLET ORAL
Qty: 90 TABLET | Refills: 1 | Status: CANCELLED | OUTPATIENT
Start: 2024-10-02 | End: 2025-10-02

## 2024-10-03 PROCEDURE — RXMED WILLOW AMBULATORY MEDICATION CHARGE

## 2024-10-08 ENCOUNTER — PHARMACY VISIT (OUTPATIENT)
Dept: PHARMACY | Facility: CLINIC | Age: 34
End: 2024-10-08
Payer: COMMERCIAL

## 2024-10-15 ENCOUNTER — SPECIALTY PHARMACY (OUTPATIENT)
Dept: PHARMACY | Facility: CLINIC | Age: 34
End: 2024-10-15

## 2024-10-15 DIAGNOSIS — L40.0 PSORIASIS VULGARIS: ICD-10-CM

## 2024-10-15 RX ORDER — APREMILAST 30 MG/1
TABLET, FILM COATED ORAL
Qty: 60 TABLET | Refills: 0 | OUTPATIENT
Start: 2024-10-15

## 2024-12-03 ENCOUNTER — APPOINTMENT (OUTPATIENT)
Dept: PRIMARY CARE | Facility: CLINIC | Age: 34
End: 2024-12-03
Payer: COMMERCIAL

## 2024-12-03 VITALS
OXYGEN SATURATION: 96 % | DIASTOLIC BLOOD PRESSURE: 80 MMHG | HEART RATE: 82 BPM | WEIGHT: 296 LBS | BODY MASS INDEX: 42.37 KG/M2 | RESPIRATION RATE: 14 BRPM | SYSTOLIC BLOOD PRESSURE: 126 MMHG | HEIGHT: 70 IN

## 2024-12-03 DIAGNOSIS — E87.6 HYPOKALEMIA: ICD-10-CM

## 2024-12-03 DIAGNOSIS — E88.810 METABOLIC SYNDROME: ICD-10-CM

## 2024-12-03 DIAGNOSIS — E66.01 CLASS 3 SEVERE OBESITY DUE TO EXCESS CALORIES WITH SERIOUS COMORBIDITY AND BODY MASS INDEX (BMI) OF 40.0 TO 44.9 IN ADULT: ICD-10-CM

## 2024-12-03 DIAGNOSIS — E66.813 CLASS 3 SEVERE OBESITY DUE TO EXCESS CALORIES WITH SERIOUS COMORBIDITY AND BODY MASS INDEX (BMI) OF 40.0 TO 44.9 IN ADULT: ICD-10-CM

## 2024-12-03 DIAGNOSIS — I10 ESSENTIAL HYPERTENSION: Primary | ICD-10-CM

## 2024-12-03 PROBLEM — R29.818 SUSPECTED SLEEP APNEA: Status: RESOLVED | Noted: 2023-10-18 | Resolved: 2024-12-03

## 2024-12-03 PROBLEM — G47.33 OSA ON CPAP: Status: RESOLVED | Noted: 2023-10-18 | Resolved: 2024-12-03

## 2024-12-03 PROCEDURE — 3008F BODY MASS INDEX DOCD: CPT | Performed by: INTERNAL MEDICINE

## 2024-12-03 PROCEDURE — 3074F SYST BP LT 130 MM HG: CPT | Performed by: INTERNAL MEDICINE

## 2024-12-03 PROCEDURE — 99213 OFFICE O/P EST LOW 20 MIN: CPT | Performed by: INTERNAL MEDICINE

## 2024-12-03 PROCEDURE — 3079F DIAST BP 80-89 MM HG: CPT | Performed by: INTERNAL MEDICINE

## 2024-12-03 RX ORDER — POTASSIUM CHLORIDE 750 MG/1
10 TABLET, FILM COATED, EXTENDED RELEASE ORAL DAILY
Qty: 90 TABLET | Refills: 3 | Status: SHIPPED | OUTPATIENT
Start: 2024-12-03 | End: 2025-12-03

## 2024-12-03 RX ORDER — AMLODIPINE BESYLATE 5 MG/1
5 TABLET ORAL
Qty: 90 TABLET | Refills: 3 | Status: SHIPPED | OUTPATIENT
Start: 2024-12-03 | End: 2025-12-03

## 2024-12-03 ASSESSMENT — PROMIS GLOBAL HEALTH SCALE
RATE_GENERAL_HEALTH: GOOD
RATE_AVERAGE_PAIN: 0
RATE_AVERAGE_FATIGUE: MILD
RATE_SOCIAL_SATISFACTION: VERY GOOD
RATE_QUALITY_OF_LIFE: VERY GOOD
CARRYOUT_PHYSICAL_ACTIVITIES: COMPLETELY
CARRYOUT_SOCIAL_ACTIVITIES: VERY GOOD
RATE_MENTAL_HEALTH: GOOD
EMOTIONAL_PROBLEMS: SOMETIMES
RATE_PHYSICAL_HEALTH: GOOD

## 2024-12-03 ASSESSMENT — ENCOUNTER SYMPTOMS
JOINT SWELLING: 0
CONSTIPATION: 0
VOMITING: 0
MYALGIAS: 0
CHILLS: 0
COUGH: 0
DIARRHEA: 0
SHORTNESS OF BREATH: 0
NAUSEA: 0
FEVER: 0
DIAPHORESIS: 0

## 2024-12-03 NOTE — PATIENT INSTRUCTIONS
CONTINUE PRESENT MEDS    2.  LABS REVIEWED LOOK REALLY GOOD, AND YOUR LIVER ENZYMES HAVE NORMALIZED WITH WEIGHT LOSS    3.  IF SLEEP BECOMES DISTURBED AGAIN, WE CAN ALWAYS REPEAT SLEEP STUDY IF YOU WISH.    4.  PLEASE CALL IF REFILLS ARE NEEDED    5.  I SENT IN AMLODIPINE AND KCL    6.  FOLLOW UP 6 MONTHS OR AS NEEDED.

## 2024-12-03 NOTE — PROGRESS NOTES
"Subjective   Les Whitney is a 34 y.o. male who presents for  FOLLOW UP   HAD FLU SHOT .    HPI   NO PROBLEMS TO SPEAK OF    GAINED A LOT OF WEIGHT NOT TAKING RYBELSUS    BEEN WORKING WITH A THERAPIST FOR FOOD ADDICTIONS    LOST 48 POUNDS FROM HIS PEAK A YEAR AGO    LIVER ENZYMES HAVE NORMALIZED WITH WEIGHT LOSS    HAD DISTANT HEP A EXPOSURE PER LABS    REGULAR EYE EXAMS DONE    SLEEPING WELL SINCE LOST WEIGHT, NOT USING CPAP      Review of Systems   Constitutional:  Negative for chills, diaphoresis and fever.   Respiratory:  Negative for cough and shortness of breath.    Cardiovascular:  Negative for chest pain and leg swelling.   Gastrointestinal:  Negative for constipation, diarrhea, nausea and vomiting.   Musculoskeletal:  Negative for joint swelling and myalgias.       Objective   /80   Pulse 82   Resp 14   Ht 1.778 m (5' 10\")   Wt 134 kg (296 lb)   SpO2 96%   BMI 42.47 kg/m²     Physical Exam  Vitals reviewed.   Constitutional:       General: He is not in acute distress.     Appearance: He is obese. He is not ill-appearing.   Cardiovascular:      Rate and Rhythm: Normal rate and regular rhythm.      Pulses: Normal pulses.      Heart sounds:      No gallop.   Pulmonary:      Breath sounds: Normal breath sounds. No wheezing, rhonchi or rales.   Abdominal:      General: Abdomen is flat. Bowel sounds are normal.      Palpations: Abdomen is soft.      Tenderness: There is no guarding or rebound.   Musculoskeletal:      Right lower leg: No edema.      Left lower leg: No edema.         Assessment/Plan   Problem List Items Addressed This Visit       Essential hypertension - Primary    Relevant Medications    amLODIPine (Norvasc) 5 mg tablet    Class 3 severe obesity due to excess calories with serious comorbidity and body mass index (BMI) of 40.0 to 44.9 in adult    Metabolic syndrome     Other Visit Diagnoses       Hypokalemia        Relevant Medications    potassium chloride CR (Klor-Con) 10 mEq ER " tablet          Patient Instructions    CONTINUE PRESENT MEDS    2.  LABS REVIEWED LOOK REALLY GOOD, AND YOUR LIVER ENZYMES HAVE NORMALIZED WITH WEIGHT LOSS    3.  IF SLEEP BECOMES DISTURBED AGAIN, WE CAN ALWAYS REPEAT SLEEP STUDY IF YOU WISH.    4.  PLEASE CALL IF REFILLS ARE NEEDED    5.  I SENT IN AMLODIPINE AND KCL    6.  FOLLOW UP 6 MONTHS OR AS NEEDED.

## 2024-12-04 ENCOUNTER — APPOINTMENT (OUTPATIENT)
Dept: GASTROENTEROLOGY | Facility: CLINIC | Age: 34
End: 2024-12-04
Payer: COMMERCIAL

## 2024-12-04 VITALS
RESPIRATION RATE: 18 BRPM | SYSTOLIC BLOOD PRESSURE: 126 MMHG | BODY MASS INDEX: 42.03 KG/M2 | WEIGHT: 293.6 LBS | DIASTOLIC BLOOD PRESSURE: 78 MMHG | HEIGHT: 70 IN | OXYGEN SATURATION: 98 % | HEART RATE: 84 BPM

## 2024-12-04 DIAGNOSIS — R74.01 TRANSAMINITIS: Primary | ICD-10-CM

## 2024-12-04 PROCEDURE — 3078F DIAST BP <80 MM HG: CPT | Performed by: STUDENT IN AN ORGANIZED HEALTH CARE EDUCATION/TRAINING PROGRAM

## 2024-12-04 PROCEDURE — 3074F SYST BP LT 130 MM HG: CPT | Performed by: STUDENT IN AN ORGANIZED HEALTH CARE EDUCATION/TRAINING PROGRAM

## 2024-12-04 PROCEDURE — 99215 OFFICE O/P EST HI 40 MIN: CPT | Performed by: STUDENT IN AN ORGANIZED HEALTH CARE EDUCATION/TRAINING PROGRAM

## 2024-12-04 PROCEDURE — 1036F TOBACCO NON-USER: CPT | Performed by: STUDENT IN AN ORGANIZED HEALTH CARE EDUCATION/TRAINING PROGRAM

## 2024-12-04 PROCEDURE — 3008F BODY MASS INDEX DOCD: CPT | Performed by: STUDENT IN AN ORGANIZED HEALTH CARE EDUCATION/TRAINING PROGRAM

## 2024-12-04 NOTE — PROGRESS NOTES
Subjective     History of Present Illness:   Les Whitney is a 34 y.o. male with hx of obesity and family hx of colon cancer in his mother (Age 40) who presents to clinic for follow up regarding colon cancer screening as well as fatty liver.    Since last visit he completed colonoscopy that was normal. Liver workup revealed ASMA of 1:160. His liver enzymes have normalized since his weight loss including most recent eval 06/01/2024.    Today he reports feeling well. No issues with acid reflux. He did gain weight after stopping Rybelsus so is now back on it.    With regards to his hx,  Patient has had fatty liver demonstrated on cross-sectional imaging dating back to 2020.  He states he has not had a drop of alcohol since 2020 as it did not fit in his lifestyle anymore.  He had mild elevation in his ALT and AST the since 2020 which is persistent as recently as December of this last year.  He has lost about 50 pounds in the past 3 months while being on semaglutide as well as diet and exercise.  He denies any abdominal pain, nausea, hematochezia, melena.    His last colonoscopy was in 2015 and was unremarkable.  He was unaware that he needed screening for colon cancer every 5 years.    He has no family history of liver pathology.      Past Medical History   has a past medical history of Anxiety, Eczema, Hypertension, and Other specified health status (02/06/2020).     Social History   reports that he has never smoked. He has never used smokeless tobacco. He reports that he does not currently use alcohol after a past usage of about 1.0 standard drink of alcohol per week. He reports current drug use. Frequency: 1.00 time per week. Drug: Marijuana.     Family History  family history includes Breast cancer in his mother; Cancer in his mother; Colon cancer in his mother; Diabetes in his father.     Allergies  Allergies   Allergen Reactions    Cefaclor Hives and Unknown    Other Unknown     Amoclan    Penicillins Hives and  Unknown     Childhood?       Medications  Current Outpatient Medications   Medication Instructions    amLODIPine (Norvasc) 5 mg tablet TAKE 1 TABLET (5 MG) BY MOUTH ONCE DAILY.    apremilast (Otezla) 30 mg tablet Take 1 tablet (30 mg) by mouth twice daily.    losartan-hydrochlorothiazide (Hyzaar) 100-25 mg tablet TAKE 1 TABLET BY MOUTH ONCE DAILY.    pantoprazole (ProtoNix) 40 mg EC tablet TAKE 1 TABLET (40 MG) BY MOUTH ONCE DAILY.    PARoxetine (Paxil) 40 mg tablet TAKE 1 TABLET (40 MG) BY MOUTH ONCE DAILY.    potassium chloride CR (Klor-Con) 10 mEq ER tablet 10 mEq, oral, Daily, Do not crush, chew, or split.    semaglutide (RYBELSUS) 14 mg, oral, Daily        Objective   Visit Vitals  /78 (BP Location: Right arm, Patient Position: Sitting, BP Cuff Size: Adult)   Pulse 84   Resp 18          Physical Exam  Vitals reviewed.   Constitutional:       Appearance: Normal appearance.   HENT:      Head: Normocephalic.      Mouth/Throat:      Mouth: Mucous membranes are moist.   Cardiovascular:      Rate and Rhythm: Normal rate and regular rhythm.   Pulmonary:      Effort: Pulmonary effort is normal.      Breath sounds: Normal breath sounds.   Abdominal:      General: Abdomen is flat.   Neurological:      General: No focal deficit present.      Mental Status: He is alert.   Psychiatric:         Mood and Affect: Mood normal.         Judgment: Judgment normal.         Assessment/Plan   Les Whitney is a 34 y.o. male with hx of obesity and family hx of colon cancer in his mother (Age 40) who presents to clinic for follow up regarding colon cancer screening as well as fatty liver with deranged liver enzymes.     Chronic liver disease workup revealed + ASMA to 1:160. In setting of negative JOEL, normalization of liver enzymes with weight loss, low suspicion for AIH and I believe risks of liver biopsy outweigh benefits at this time. Will plan to survey liver enzymes q6 months. Repeat testing planned today    Discussed need  to obtain hep B vaccine with PCP.     He will return to GI clinic with any worsening of his GERD. Continue PPI PO daily.         Problem List Items Addressed This Visit    None  Visit Diagnoses       Transaminitis    -  Primary    Relevant Orders    Hepatic Function Panel                       Magalie Adams MD         My final recommendations will be communicated back to the requesting physician by way of shared Medical record or letter to requesting physician via fax.

## 2024-12-23 ENCOUNTER — PHARMACY VISIT (OUTPATIENT)
Dept: PHARMACY | Facility: CLINIC | Age: 34
End: 2024-12-23
Payer: COMMERCIAL

## 2024-12-23 PROCEDURE — RXMED WILLOW AMBULATORY MEDICATION CHARGE

## 2025-01-17 ENCOUNTER — APPOINTMENT (OUTPATIENT)
Dept: DERMATOLOGY | Facility: CLINIC | Age: 35
End: 2025-01-17
Payer: COMMERCIAL

## 2025-01-21 ENCOUNTER — APPOINTMENT (OUTPATIENT)
Dept: DERMATOLOGY | Facility: CLINIC | Age: 35
End: 2025-01-21
Payer: COMMERCIAL

## 2025-01-23 ENCOUNTER — TELEPHONE (OUTPATIENT)
Dept: DERMATOLOGY | Facility: CLINIC | Age: 35
End: 2025-01-23
Payer: COMMERCIAL

## 2025-01-23 NOTE — TELEPHONE ENCOUNTER
Pt called office stating his Otezla could not be refilled through mychart. He had a fuv scheduled but was pushed out d/t RP being out of office. New appt was not until 3/4/25. LV 10/2023. Found pt sooner appt 1/29/25. Pt to stop in [place] office tomorrow and  sample of Otzela to hold him over.

## 2025-01-25 DIAGNOSIS — I10 ESSENTIAL HYPERTENSION: ICD-10-CM

## 2025-01-26 PROCEDURE — RXMED WILLOW AMBULATORY MEDICATION CHARGE

## 2025-01-27 PROCEDURE — RXMED WILLOW AMBULATORY MEDICATION CHARGE

## 2025-01-27 RX ORDER — LOSARTAN POTASSIUM AND HYDROCHLOROTHIAZIDE 25; 100 MG/1; MG/1
1 TABLET ORAL DAILY
Qty: 90 TABLET | Refills: 3 | Status: SHIPPED | OUTPATIENT
Start: 2025-01-27

## 2025-01-29 ENCOUNTER — APPOINTMENT (OUTPATIENT)
Dept: DERMATOLOGY | Facility: CLINIC | Age: 35
End: 2025-01-29
Payer: COMMERCIAL

## 2025-01-29 DIAGNOSIS — Z79.899 ENCOUNTER FOR LONG-TERM (CURRENT) USE OF MEDICATIONS: ICD-10-CM

## 2025-01-29 DIAGNOSIS — L40.0 PSORIASIS VULGARIS: Primary | ICD-10-CM

## 2025-01-29 PROCEDURE — 99213 OFFICE O/P EST LOW 20 MIN: CPT | Performed by: DERMATOLOGY

## 2025-01-29 PROCEDURE — RXMED WILLOW AMBULATORY MEDICATION CHARGE

## 2025-01-29 PROCEDURE — G2211 COMPLEX E/M VISIT ADD ON: HCPCS | Performed by: DERMATOLOGY

## 2025-01-29 PROCEDURE — 1036F TOBACCO NON-USER: CPT | Performed by: DERMATOLOGY

## 2025-01-29 RX ORDER — APREMILAST 30 MG/1
TABLET, FILM COATED ORAL
Qty: 60 TABLET | Refills: 11 | Status: SHIPPED | OUTPATIENT
Start: 2025-01-29

## 2025-01-29 RX ORDER — PIMECROLIMUS 10 MG/G
CREAM TOPICAL
Qty: 30 G | Refills: 3 | Status: SHIPPED | OUTPATIENT
Start: 2025-01-29

## 2025-01-29 RX ORDER — CLOBETASOL PROPIONATE 0.5 MG/ML
SOLUTION TOPICAL
Qty: 50 ML | Refills: 3 | Status: SHIPPED | OUTPATIENT
Start: 2025-01-29

## 2025-01-29 ASSESSMENT — ITCH NUMERIC RATING SCALE: HOW SEVERE IS YOUR ITCHING?: 4

## 2025-01-29 ASSESSMENT — PHYSICIAN GLOBAL ASSESSMENT (PGA): WHAT IS THE PGA: PHYSICIAN GLOBAL ASSESSMENT:  3 - MODERATE

## 2025-01-29 ASSESSMENT — DERMATOLOGY QUALITY OF LIFE (QOL) ASSESSMENT
ARE THERE EXCLUSIONS OR EXCEPTIONS FOR THE QUALITY OF LIFE ASSESSMENT: NO
RATE HOW BOTHERED YOU ARE BY SYMPTOMS OF YOUR SKIN PROBLEM (EG, ITCHING, STINGING BURNING, HURTING OR SKIN IRRITATION): 3
WHAT SINGLE SKIN CONDITION LISTED BELOW IS THE PATIENT ANSWERING THE QUALITY-OF-LIFE ASSESSMENT QUESTIONS ABOUT: PSORIASIS
RATE HOW BOTHERED YOU ARE BY EFFECTS OF YOUR SKIN PROBLEMS ON YOUR ACTIVITIES (EG, GOING OUT, ACCOMPLISHING WHAT YOU WANT, WORK ACTIVITIES OR YOUR RELATIONSHIPS WITH OTHERS): 3
RATE HOW EMOTIONALLY BOTHERED YOU ARE BY YOUR SKIN PROBLEM (FOR EXAMPLE, WORRY, EMBARRASSMENT, FRUSTRATION): 3

## 2025-01-29 ASSESSMENT — PATIENT GLOBAL ASSESSMENT (PGA): PATIENT GLOBAL ASSESSMENT: PATIENT GLOBAL ASSESSMENT:  2 - MILD

## 2025-01-29 ASSESSMENT — DERMATOLOGY PATIENT ASSESSMENT
DO YOU USE SUNSCREEN: OCCASIONALLY
FOR PATIENTS COMING IN FOR A FOLLOW-UP VISIT - HAVE THERE BEEN ANY CHANGES IN YOUR HEALTH SINCE YOUR LAST VISIT: ALL IN MYCHART
ARE YOU AN ORGAN TRANSPLANT RECIPIENT: NO
DO YOU USE A TANNING BED: NO
DO YOU HAVE ANY NEW OR CHANGING LESIONS: NO

## 2025-01-29 ASSESSMENT — BODY SURFACE AREA (BSA): WHAT IS THE BSA PERCENTAGE: >10% BODY SURFACE AREA INVOLVED

## 2025-01-29 NOTE — PROGRESS NOTES
Subjective     Les Whitney is a 34 y.o. male who presents for the following: Psoriasis (Pt here following up on Psoriasis, widespread over body. Current treatment Otezla 30mg QD-BID. Pt reports GI issues when consistently taking BID. Pt has fare on scalp, possibly from stress. Pt has Enstilar foam for flares. ).     Review of Systems:  No other skin or systemic complaints other than what is documented elsewhere in the note.    The following portions of the chart were reviewed this encounter and updated as appropriate:          Skin Cancer History  No skin cancer on file.      Specialty Problems          Dermatology Problems    Psoriasis vulgaris    Guttate psoriasis    Melanocytic nevi of trunk    Neoplasm of uncertain behavior of skin    Other skin changes due to chronic exposure to nonionizing radiation    Dermatitis, unspecified    Eczema    Psoriasis    Seborrheic dermatitis    Tinea corporis        Objective   Well appearing patient in no apparent distress; mood and affect are within normal limits.    A focused skin examination was performed of the face scalp, upper extremities. All findings within normal limits unless otherwise noted below.    Assessment/Plan   1. Psoriasis vulgaris  Scalp, elbows, eyebrows, face with erythematous scaly plaques  BSA 15%    The chronic and intermittently flaring nature of this skin condition was discussed today.   This is an inflammatory skin condition that occurs in a bimodal age distribution in genetically pre-disposed individuals. The inflammatory skin condition causes skin to turn over 2x faster than normal skin. T  he various treatment options were reviewed which include: topical steroids, topical retinoids, nb-UVB therapy, systemic medications including oral medications and injectable (biologic) medications.     Patient was on Otezla 30mg 2x daily and well controlled, due to nausea he decreased to 30mg daily and was still doing okay, however recently psoriasis has  significantly increased at the once daily dosing.     Recommend:  -  for the next 6 weeks increase to 30mg 2x daily.   -Start clobetasol 0.05% scalp solution Patient to apply 2x daily x 2 wks then decrease to 2x/day 2 days per week. Can repeat full 2 week course as often as every 6-8 weeks as needed for flaring. Side effects of topical steroids includes, but is not limited to skin atrophy and dyspigmentation.  -Start pimecrolimus 1% cream for the face 2x daily    Side effects of medications reviewed including nausea, diarrhea with otezla.    Consider Sotyktu  vs biologic if not improving - pt will contact office to discuss further if he desires. We did discuss all medications do suppress the immune system.      clobetasol (Temovate) 0.05 % external solution  Apply 3-4 drops to the scalp 2x daily x 2 weeks then 2x daily 2 days per week    pimecrolimus (Elidel) 1 % cream  Apply to the eyebrows, face 2x daily as needed    Related Medications  apremilast (Otezla) 30 mg tablet  Take 1 tablet (30 mg) by mouth twice daily.      Follow up in 1 year or sooner if needed.

## 2025-01-30 ENCOUNTER — PHARMACY VISIT (OUTPATIENT)
Dept: PHARMACY | Facility: CLINIC | Age: 35
End: 2025-01-30
Payer: COMMERCIAL

## 2025-01-30 ENCOUNTER — TELEPHONE (OUTPATIENT)
Dept: GASTROENTEROLOGY | Facility: CLINIC | Age: 35
End: 2025-01-30
Payer: COMMERCIAL

## 2025-01-30 ENCOUNTER — SPECIALTY PHARMACY (OUTPATIENT)
Dept: PHARMACY | Facility: CLINIC | Age: 35
End: 2025-01-30

## 2025-01-30 PROCEDURE — RXMED WILLOW AMBULATORY MEDICATION CHARGE

## 2025-01-30 NOTE — TELEPHONE ENCOUNTER
I left a message to call [phone] to reschedule appointment with Dr. SEAN Adams for 6 month follow up.      She is going to be out of the office on 6/4/25.  Cancelled appointment.

## 2025-01-31 ENCOUNTER — PHARMACY VISIT (OUTPATIENT)
Dept: PHARMACY | Facility: CLINIC | Age: 35
End: 2025-01-31
Payer: COMMERCIAL

## 2025-02-03 ENCOUNTER — TELEPHONE (OUTPATIENT)
Dept: GASTROENTEROLOGY | Facility: CLINIC | Age: 35
End: 2025-02-03
Payer: COMMERCIAL

## 2025-02-03 NOTE — TELEPHONE ENCOUNTER
I left another message to call [phone] to reschedule appointment with Dr. SEAN Adams for 6 month follow up.       She is going to be out of the office on 6/4/25.

## 2025-02-07 ENCOUNTER — TELEPHONE (OUTPATIENT)
Dept: GASTROENTEROLOGY | Facility: CLINIC | Age: 35
End: 2025-02-07
Payer: COMMERCIAL

## 2025-02-07 NOTE — TELEPHONE ENCOUNTER
I left another message to call [phone] to reschedule appointment with Dr. SEAN Adams for 6 month follow up.       She is going to be out of the office on 6/4/25.

## 2025-03-04 ENCOUNTER — APPOINTMENT (OUTPATIENT)
Dept: DERMATOLOGY | Facility: CLINIC | Age: 35
End: 2025-03-04
Payer: COMMERCIAL

## 2025-03-04 ENCOUNTER — TELEPHONE (OUTPATIENT)
Dept: GASTROENTEROLOGY | Facility: CLINIC | Age: 35
End: 2025-03-04

## 2025-03-04 NOTE — TELEPHONE ENCOUNTER
I left another message to call [phone] to reschedule appointment with Dr. Adams.  She will be out of the office.

## 2025-03-20 ENCOUNTER — SPECIALTY PHARMACY (OUTPATIENT)
Dept: PHARMACY | Facility: CLINIC | Age: 35
End: 2025-03-20

## 2025-03-20 PROCEDURE — RXMED WILLOW AMBULATORY MEDICATION CHARGE

## 2025-03-24 ENCOUNTER — SPECIALTY PHARMACY (OUTPATIENT)
Dept: PHARMACY | Facility: CLINIC | Age: 35
End: 2025-03-24

## 2025-03-25 ENCOUNTER — PHARMACY VISIT (OUTPATIENT)
Dept: PHARMACY | Facility: CLINIC | Age: 35
End: 2025-03-25
Payer: COMMERCIAL

## 2025-04-01 ENCOUNTER — SPECIALTY PHARMACY (OUTPATIENT)
Dept: PHARMACY | Facility: CLINIC | Age: 35
End: 2025-04-01

## 2025-04-09 ENCOUNTER — APPOINTMENT (OUTPATIENT)
Dept: UROLOGY | Facility: CLINIC | Age: 35
End: 2025-04-09
Payer: COMMERCIAL

## 2025-04-24 ENCOUNTER — SPECIALTY PHARMACY (OUTPATIENT)
Dept: PHARMACY | Facility: CLINIC | Age: 35
End: 2025-04-24

## 2025-04-24 PROCEDURE — RXMED WILLOW AMBULATORY MEDICATION CHARGE

## 2025-04-25 ENCOUNTER — PHARMACY VISIT (OUTPATIENT)
Dept: PHARMACY | Facility: CLINIC | Age: 35
End: 2025-04-25
Payer: COMMERCIAL

## 2025-04-30 ENCOUNTER — APPOINTMENT (OUTPATIENT)
Dept: UROLOGY | Facility: CLINIC | Age: 35
End: 2025-04-30
Payer: COMMERCIAL

## 2025-04-30 VITALS
DIASTOLIC BLOOD PRESSURE: 84 MMHG | HEIGHT: 70 IN | BODY MASS INDEX: 44.38 KG/M2 | SYSTOLIC BLOOD PRESSURE: 140 MMHG | HEART RATE: 86 BPM | WEIGHT: 310 LBS | TEMPERATURE: 97.6 F

## 2025-04-30 DIAGNOSIS — E66.813 CLASS 3 SEVERE OBESITY DUE TO EXCESS CALORIES WITH SERIOUS COMORBIDITY AND BODY MASS INDEX (BMI) OF 40.0 TO 44.9 IN ADULT: ICD-10-CM

## 2025-04-30 DIAGNOSIS — Z30.09 ENCOUNTER FOR VASECTOMY COUNSELING: Primary | ICD-10-CM

## 2025-04-30 PROCEDURE — 3008F BODY MASS INDEX DOCD: CPT | Performed by: STUDENT IN AN ORGANIZED HEALTH CARE EDUCATION/TRAINING PROGRAM

## 2025-04-30 PROCEDURE — 3079F DIAST BP 80-89 MM HG: CPT | Performed by: STUDENT IN AN ORGANIZED HEALTH CARE EDUCATION/TRAINING PROGRAM

## 2025-04-30 PROCEDURE — 3077F SYST BP >= 140 MM HG: CPT | Performed by: STUDENT IN AN ORGANIZED HEALTH CARE EDUCATION/TRAINING PROGRAM

## 2025-04-30 PROCEDURE — 99204 OFFICE O/P NEW MOD 45 MIN: CPT | Performed by: STUDENT IN AN ORGANIZED HEALTH CARE EDUCATION/TRAINING PROGRAM

## 2025-04-30 ASSESSMENT — PATIENT HEALTH QUESTIONNAIRE - PHQ9
1. LITTLE INTEREST OR PLEASURE IN DOING THINGS: NOT AT ALL
SUM OF ALL RESPONSES TO PHQ9 QUESTIONS 1 AND 2: 0
2. FEELING DOWN, DEPRESSED OR HOPELESS: NOT AT ALL

## 2025-04-30 NOTE — PROGRESS NOTES
Les presents as a new patient to discuss vasectomy.   The patient’s EMR has been reviewed.  Lives in [place], [place].  Status: .   Children: 3x  [4yo, 7yo, 7yo]    History Of Present Illness [HPI]:  Denies wanting any further children in the future.   Acknowledges mutual agreement with Partner.  Denies any testicular pain or scrotal swelling.  Denies any urinary issues or sexual health concerns.     He has been working on weight loss.   Continues on Rybelsus (semaglutide) with benefit.   He has been trying to be more active;  States he has been walking more.   Has lost ~40 lbs in total thus far.     PMH: HTN, eczema, anxiety, morbidly obese (BMI >40).  PSH: Breast surgery, hand surgery.  FH: Denies FH of  malignancy.  SH: Non-smoker.     Medical History[1]  Surgical History[2]  Family History[3]  Tobacco Use History[4]  Current Medications[5]  Allergies[6]  Past medical, surgical, family and social history in the chart was reviewed and is accurate including any additions to what is in this HPI.    REVIEW OF SYSTEMS [ROS]:  Constitutional: denies any unintentional weight loss or change in strength.  Integumentary: denies any rashes or pruritus.  Eyes: denies any double vision or eye pain.  Ear/Nose/Mouth/Throat: denies any nosebleeds or gum bleeds.  Cardiovascular: denies any chest pain or syncope.  Respiratory: denies hemoptysis.  Gastrointestinal: denies nausea or vomiting.  Musculoskeletal: denies muscle cramping or weakness.  Neurologic: denies convulsions or seizures.  Hematologic/Lymphatic: denies bleeding tendencies.  Endocrine: denies heat/cold intolerance.  All other systems have been reviewed and are negative unless otherwise noted in the HPI.     OBJECTIVE:  Visit Vitals  /84   Pulse 86   Temp 36.4 °C (97.6 °F)     PHYSICAL EXAM:  Constitutional: No obvious distress. Morbidly obese.   Eyes: Non-injected conjunctiva, sclera clear, EOMI.  Ears/Nose/Mouth/Throat: No obvious drainage per ears  or nose.  Cardiovascular: Extremities are warm and well perfused. No edema, cyanosis or pallor.  Respiratory: No audible wheezing/stridor; respirations do not appear labored.  Gastrointestinal: Abdomen soft, not distended.  Musculoskeletal: Normal ROM of extremities.  Skin: No obvious rashes or open sores.  Neurologic: Alert and oriented, CN 2-12 grossly intact.  Psychiatric: Answers questions appropriately with normal affect.  Hematologic/Lymphatic/Immunologic: No obvious bruises or sites of spontaneous bleeding.  Genitourinary: No CVA tenderness, bladder not palpable.   Vas deferens palpated bilaterally.        ASSESSMENT/PLAN:  Problem List Items Addressed This Visit       Class 3 severe obesity due to excess calories with serious comorbidity and body mass index (BMI) of 40.0 to 44.9 in adult     Other Visit Diagnoses         Encounter for vasectomy counseling    -  Primary           Vasectomy Counseling:  Vasectomy is intended to be a permanent form of contraception.  Vasectomy does not produce immediate sterility.  Following vasectomy, another form of contraception is required until vas occlusion is confirmed by post-vasectomy semen analysis (PVSA).  Even after vas occlusion is confirmed, vasectomy is not 100% reliable in preventing pregnancy.  The risk of pregnancy after vasectomy is approximately 1 in 2,000 for men who have post-vasectomy azoospermia or PVSA showing rare non-motile sperm (RNMS).  Repeat vasectomy is necessary in <1% of vasectomies, provided that a technique for vas occlusion known to have a low occlusive failure rate has been used.  Patient should refrain from ejaculation for approximately one week after vasectomy.  Options for fertility after vasectomy reversal and sperm retrieval with in vitro fertilization. These options are not always successful, and they may be expensive.    Patient understand that he must have protected intercourse after the procedure (vasectomy) to the point where he  is able to provide a negative semen sample. He may stop using other methods of ocntraception when examination of one well-mixed, uncentrifuged, fresh post-vasectomy semen specimen shows azoospermia or only rare non-motile sperm (RNMS or <100,000 non-motile sperm/mL).    Will proceed with vasectomy in clinic under local.  Rx for valium sent to the patient pharmacy, to be taken morning of the procedure.   All questions were answered to the patient’s satisfaction.  Patient agrees with the plan and wishes to proceed.    Advised on weight management and strength training while on semaglutide     Scribed for Dr. Ellis Claire by Jabari John.  I, Dr. Ellis Claire, have personally reviewed and agreed with the information entered by the Virtual Scribe. 04/30/25         [1]   Past Medical History:  Diagnosis Date    Anxiety     Eczema     Hypertension     Other specified health status 02/06/2020    No pertinent past medical history   [2]   Past Surgical History:  Procedure Laterality Date    OTHER SURGICAL HISTORY  02/06/2020    Breast surgery    OTHER SURGICAL HISTORY  12/09/2021    Hand surgery   [3]   Family History  Problem Relation Name Age of Onset    Breast cancer Mother Cecily     Cancer Mother Cecily     Colon cancer Mother Cecily     Diabetes Father Melvin    [4]   Social History  Tobacco Use   Smoking Status Never   Smokeless Tobacco Never   [5]   Current Outpatient Medications   Medication Sig Dispense Refill    amLODIPine (Norvasc) 5 mg tablet TAKE 1 TABLET (5 MG) BY MOUTH ONCE DAILY. 90 tablet 3    apremilast (Otezla) 30 mg tablet Take 1 tablet (30 mg) by mouth twice daily. 60 tablet 11    clobetasol (Temovate) 0.05 % external solution Apply 3-4 drops to the scalp 2 times daily for 2 weeks, THEN 2 times daily 2 days per week 50 mL 3    losartan-hydrochlorothiazide (Hyzaar) 100-25 mg tablet Take 1 tablet by mouth once daily. 90 tablet 3    pantoprazole (ProtoNix) 40 mg EC tablet TAKE 1 TABLET (40 MG) BY MOUTH  ONCE DAILY. 90 tablet 3    PARoxetine (Paxil) 40 mg tablet TAKE 1 TABLET (40 MG) BY MOUTH ONCE DAILY. 90 tablet 3    pimecrolimus (Elidel) 1 % cream Apply to the eyebrows and face 2 times daily as needed 30 g 3    potassium chloride CR (Klor-Con) 10 mEq ER tablet Take 1 tablet (10 mEq) by mouth once daily. Do not crush, chew, or split. 90 tablet 3    semaglutide (Rybelsus) 14 mg tablet tablet Take 1 tablet (14 mg) by mouth once daily. 90 tablet 3     No current facility-administered medications for this visit.   [6]   Allergies  Allergen Reactions    Cefaclor Hives and Unknown    Other Unknown     Amoclan    Penicillins Hives and Unknown     Childhood?

## 2025-05-02 PROCEDURE — RXMED WILLOW AMBULATORY MEDICATION CHARGE

## 2025-05-03 ENCOUNTER — APPOINTMENT (OUTPATIENT)
Dept: RADIOLOGY | Facility: HOSPITAL | Age: 35
End: 2025-05-03
Payer: COMMERCIAL

## 2025-05-03 ENCOUNTER — HOSPITAL ENCOUNTER (EMERGENCY)
Facility: HOSPITAL | Age: 35
Discharge: HOME | End: 2025-05-03
Attending: STUDENT IN AN ORGANIZED HEALTH CARE EDUCATION/TRAINING PROGRAM
Payer: COMMERCIAL

## 2025-05-03 VITALS
WEIGHT: 310 LBS | SYSTOLIC BLOOD PRESSURE: 147 MMHG | DIASTOLIC BLOOD PRESSURE: 86 MMHG | TEMPERATURE: 97.5 F | OXYGEN SATURATION: 97 % | BODY MASS INDEX: 44.38 KG/M2 | RESPIRATION RATE: 18 BRPM | HEIGHT: 70 IN | HEART RATE: 86 BPM

## 2025-05-03 DIAGNOSIS — L03.115 CELLULITIS OF RIGHT LOWER EXTREMITY: Primary | ICD-10-CM

## 2025-05-03 LAB
ALBUMIN SERPL BCP-MCNC: 4.6 G/DL (ref 3.4–5)
ALP SERPL-CCNC: 65 U/L (ref 33–120)
ALT SERPL W P-5'-P-CCNC: 34 U/L (ref 10–52)
ANION GAP SERPL CALC-SCNC: 10 MMOL/L (ref 10–20)
AST SERPL W P-5'-P-CCNC: 20 U/L (ref 9–39)
BASOPHILS # BLD AUTO: 0.04 X10*3/UL (ref 0–0.1)
BASOPHILS NFR BLD AUTO: 0.3 %
BILIRUB SERPL-MCNC: 1.5 MG/DL (ref 0–1.2)
BUN SERPL-MCNC: 22 MG/DL (ref 6–23)
CALCIUM SERPL-MCNC: 9.4 MG/DL (ref 8.6–10.3)
CHLORIDE SERPL-SCNC: 99 MMOL/L (ref 98–107)
CO2 SERPL-SCNC: 30 MMOL/L (ref 21–32)
CREAT SERPL-MCNC: 0.84 MG/DL (ref 0.5–1.3)
EGFRCR SERPLBLD CKD-EPI 2021: >90 ML/MIN/1.73M*2
EOSINOPHIL # BLD AUTO: 0.17 X10*3/UL (ref 0–0.7)
EOSINOPHIL NFR BLD AUTO: 1.3 %
ERYTHROCYTE [DISTWIDTH] IN BLOOD BY AUTOMATED COUNT: 13.4 % (ref 11.5–14.5)
GLUCOSE SERPL-MCNC: 85 MG/DL (ref 74–99)
HCT VFR BLD AUTO: 43.2 % (ref 41–52)
HGB BLD-MCNC: 14.2 G/DL (ref 13.5–17.5)
IMM GRANULOCYTES # BLD AUTO: 0.04 X10*3/UL (ref 0–0.7)
IMM GRANULOCYTES NFR BLD AUTO: 0.3 % (ref 0–0.9)
LYMPHOCYTES # BLD AUTO: 2.91 X10*3/UL (ref 1.2–4.8)
LYMPHOCYTES NFR BLD AUTO: 21.6 %
MCH RBC QN AUTO: 26 PG (ref 26–34)
MCHC RBC AUTO-ENTMCNC: 32.9 G/DL (ref 32–36)
MCV RBC AUTO: 79 FL (ref 80–100)
MONOCYTES # BLD AUTO: 1.27 X10*3/UL (ref 0.1–1)
MONOCYTES NFR BLD AUTO: 9.4 %
NEUTROPHILS # BLD AUTO: 9.03 X10*3/UL (ref 1.2–7.7)
NEUTROPHILS NFR BLD AUTO: 67.1 %
NRBC BLD-RTO: 0 /100 WBCS (ref 0–0)
PLATELET # BLD AUTO: 224 X10*3/UL (ref 150–450)
POTASSIUM SERPL-SCNC: 3.3 MMOL/L (ref 3.5–5.3)
PROT SERPL-MCNC: 7.8 G/DL (ref 6.4–8.2)
RBC # BLD AUTO: 5.47 X10*6/UL (ref 4.5–5.9)
SODIUM SERPL-SCNC: 136 MMOL/L (ref 136–145)
WBC # BLD AUTO: 13.5 X10*3/UL (ref 4.4–11.3)

## 2025-05-03 PROCEDURE — 85025 COMPLETE CBC W/AUTO DIFF WBC: CPT

## 2025-05-03 PROCEDURE — 2500000004 HC RX 250 GENERAL PHARMACY W/ HCPCS (ALT 636 FOR OP/ED): Mod: JZ

## 2025-05-03 PROCEDURE — 99284 EMERGENCY DEPT VISIT MOD MDM: CPT | Performed by: STUDENT IN AN ORGANIZED HEALTH CARE EDUCATION/TRAINING PROGRAM

## 2025-05-03 PROCEDURE — 80053 COMPREHEN METABOLIC PANEL: CPT

## 2025-05-03 PROCEDURE — 96375 TX/PRO/DX INJ NEW DRUG ADDON: CPT

## 2025-05-03 PROCEDURE — 73590 X-RAY EXAM OF LOWER LEG: CPT | Mod: RT

## 2025-05-03 PROCEDURE — 73590 X-RAY EXAM OF LOWER LEG: CPT | Mod: RIGHT SIDE | Performed by: STUDENT IN AN ORGANIZED HEALTH CARE EDUCATION/TRAINING PROGRAM

## 2025-05-03 PROCEDURE — 96365 THER/PROPH/DIAG IV INF INIT: CPT

## 2025-05-03 PROCEDURE — 87077 CULTURE AEROBIC IDENTIFY: CPT | Mod: STJLAB

## 2025-05-03 PROCEDURE — 36415 COLL VENOUS BLD VENIPUNCTURE: CPT

## 2025-05-03 RX ORDER — KETOROLAC TROMETHAMINE 15 MG/ML
15 INJECTION, SOLUTION INTRAMUSCULAR; INTRAVENOUS ONCE
Status: COMPLETED | OUTPATIENT
Start: 2025-05-03 | End: 2025-05-03

## 2025-05-03 RX ORDER — CIPROFLOXACIN 500 MG/1
500 TABLET ORAL 2 TIMES DAILY
Qty: 14 TABLET | Refills: 0 | Status: SHIPPED | OUTPATIENT
Start: 2025-05-03 | End: 2025-05-10

## 2025-05-03 RX ORDER — AMOXICILLIN AND CLAVULANATE POTASSIUM 875; 125 MG/1; MG/1
1 TABLET, FILM COATED ORAL EVERY 12 HOURS
Qty: 14 TABLET | Refills: 0 | Status: SHIPPED | OUTPATIENT
Start: 2025-05-03 | End: 2025-05-10

## 2025-05-03 RX ADMIN — KETOROLAC TROMETHAMINE 15 MG: 15 INJECTION, SOLUTION INTRAMUSCULAR; INTRAVENOUS at 22:23

## 2025-05-03 RX ADMIN — PIPERACILLIN SODIUM AND TAZOBACTAM SODIUM 3.38 G: 3; .375 INJECTION, SOLUTION INTRAVENOUS at 22:25

## 2025-05-03 ASSESSMENT — PAIN SCALES - GENERAL
PAINLEVEL_OUTOF10: 2
PAINLEVEL_OUTOF10: 8

## 2025-05-03 ASSESSMENT — PAIN DESCRIPTION - LOCATION
LOCATION: LEG
LOCATION: LEG

## 2025-05-03 ASSESSMENT — PAIN DESCRIPTION - DESCRIPTORS
DESCRIPTORS: THROBBING
DESCRIPTORS: THROBBING;TIGHTNESS

## 2025-05-03 ASSESSMENT — PAIN DESCRIPTION - ONSET: ONSET: AWAKENED FROM SLEEP

## 2025-05-03 ASSESSMENT — PAIN DESCRIPTION - ORIENTATION
ORIENTATION: RIGHT
ORIENTATION: RIGHT

## 2025-05-03 ASSESSMENT — PAIN - FUNCTIONAL ASSESSMENT
PAIN_FUNCTIONAL_ASSESSMENT: 0-10
PAIN_FUNCTIONAL_ASSESSMENT: 0-10

## 2025-05-03 ASSESSMENT — COLUMBIA-SUICIDE SEVERITY RATING SCALE - C-SSRS
2. HAVE YOU ACTUALLY HAD ANY THOUGHTS OF KILLING YOURSELF?: NO
1. IN THE PAST MONTH, HAVE YOU WISHED YOU WERE DEAD OR WISHED YOU COULD GO TO SLEEP AND NOT WAKE UP?: NO
6. HAVE YOU EVER DONE ANYTHING, STARTED TO DO ANYTHING, OR PREPARED TO DO ANYTHING TO END YOUR LIFE?: NO

## 2025-05-03 ASSESSMENT — PAIN DESCRIPTION - PAIN TYPE
TYPE: ACUTE PAIN
TYPE: ACUTE PAIN

## 2025-05-03 ASSESSMENT — PAIN DESCRIPTION - PROGRESSION: CLINICAL_PROGRESSION: NOT CHANGED

## 2025-05-03 ASSESSMENT — PAIN DESCRIPTION - FREQUENCY: FREQUENCY: CONSTANT/CONTINUOUS

## 2025-05-04 NOTE — ED PROVIDER NOTES
EMERGENCY DEPARTMENT ENCOUNTER      Pt Name: Les Whitney  MRN: [id number]  Birthdate 1990  Date of evaluation: 5/3/2025    HISTORY OF PRESENT ILLNESS    Les Whitney is an 34 y.o. male with history including psoriasis, hypertension, GERD, KIERA presenting to the emergency department for possible infection right lower extremity.  Patient states that he noticed some redness to the right lower extremity 3-1/2 days ago.  He continued to worsen over the course of the last few days.  He states is very warm to the touch.  It started to become painful today.  He also noticed triage that there was some drainage from the center point.  He thinks he might of scratched it in the middle the night.  Patient denies any history of previous cellulitic infections.  He denies any fevers, chest pain, nausea, vomiting, diarrhea, urinary symptoms.    PAST MEDICAL HISTORY   Medical History[1]    SURGICAL HISTORY     Surgical History[2]    CURRENT MEDICATIONS       Discharge Medication List as of 5/3/2025 11:16 PM        CONTINUE these medications which have NOT CHANGED    Details   amLODIPine (Norvasc) 5 mg tablet TAKE 1 TABLET (5 MG) BY MOUTH ONCE DAILY., Starting Tue 12/3/2024, Until Wed 12/3/2025 at 2359, Normal      apremilast (Otezla) 30 mg tablet Take 1 tablet (30 mg) by mouth twice daily., Normal      clobetasol (Temovate) 0.05 % external solution Apply 3-4 drops to the scalp 2 times daily for 2 weeks, THEN 2 times daily 2 days per week, Normal      losartan-hydrochlorothiazide (Hyzaar) 100-25 mg tablet Take 1 tablet by mouth once daily., Starting Mon 1/27/2025, Normal      pantoprazole (ProtoNix) 40 mg EC tablet TAKE 1 TABLET (40 MG) BY MOUTH ONCE DAILY., Starting Wed 10/2/2024, Until Thu 10/2/2025 at 2359, Normal      PARoxetine (Paxil) 40 mg tablet TAKE 1 TABLET (40 MG) BY MOUTH ONCE DAILY., Starting Wed 10/2/2024, Until Thu 10/2/2025 at 2359, Normal      pimecrolimus (Elidel) 1 % cream Apply to the eyebrows and face 2  times daily as needed, Normal      potassium chloride CR (Klor-Con) 10 mEq ER tablet Take 1 tablet (10 mEq) by mouth once daily. Do not crush, chew, or split., Starting Tue 12/3/2024, Until Wed 12/3/2025, Normal      semaglutide (Rybelsus) 14 mg tablet tablet Take 1 tablet (14 mg) by mouth once daily., Starting Mon 9/23/2024, Print             ALLERGIES     Cefaclor, Other, and Penicillins    FAMILY HISTORY     Family History[3]     SOCIAL HISTORY     Social History[4]    PHYSICAL EXAM       ED Triage Vitals [05/03/25 2121]   Temperature Heart Rate Respirations BP   36.4 °C (97.5 °F) (!) 101 17 144/75      Pulse Ox Temp src Heart Rate Source Patient Position   97 % -- Monitor Sitting      BP Location FiO2 (%)     Right arm --       Physical Exam  Vitals and nursing note reviewed.   Constitutional:       General: He is not in acute distress.     Appearance: He is well-developed. He is obese.   HENT:      Head: Normocephalic and atraumatic.   Eyes:      Conjunctiva/sclera: Conjunctivae normal.   Cardiovascular:      Rate and Rhythm: Normal rate and regular rhythm.      Heart sounds: No murmur heard.  Pulmonary:      Effort: Pulmonary effort is normal. No respiratory distress.      Breath sounds: Normal breath sounds.   Musculoskeletal:         General: Tenderness (Right calf) present. No swelling.   Skin:     General: Skin is warm and dry.      Comments: Erythema with the redness extending approximately concern meters wide by 15 cm long.  Pinpoint wound with clear serous drainage, tenderness to palpation no palpable fluctuance   Neurological:      General: No focal deficit present.      Mental Status: He is alert and oriented to person, place, and time.          DIAGNOSTIC RESULTS     LABS:  Labs Reviewed   CBC WITH AUTO DIFFERENTIAL - Abnormal       Result Value    WBC 13.5 (*)     nRBC 0.0      RBC 5.47      Hemoglobin 14.2      Hematocrit 43.2      MCV 79 (*)     MCH 26.0      MCHC 32.9      RDW 13.4      Platelets  224      Neutrophils % 67.1      Immature Granulocytes %, Automated 0.3      Lymphocytes % 21.6      Monocytes % 9.4      Eosinophils % 1.3      Basophils % 0.3      Neutrophils Absolute 9.03 (*)     Immature Granulocytes Absolute, Automated 0.04      Lymphocytes Absolute 2.91      Monocytes Absolute 1.27 (*)     Eosinophils Absolute 0.17      Basophils Absolute 0.04     COMPREHENSIVE METABOLIC PANEL - Abnormal    Glucose 85      Sodium 136      Potassium 3.3 (*)     Chloride 99      Bicarbonate 30      Anion Gap 10      Urea Nitrogen 22      Creatinine 0.84      eGFR >90      Calcium 9.4      Albumin 4.6      Alkaline Phosphatase 65      Total Protein 7.8      AST 20      Bilirubin, Total 1.5 (*)     ALT 34     TISSUE/WOUND CULTURE/SMEAR       All other labs were within normal range or not returned as of this dictation.    Imaging  XR tibia fibula right 2 views   Final Result   Nonspecific diffuse leg edema. No evidence of soft tissue gas.             MACRO:   None        Signed by: Dmitry Chinchilla 5/3/2025 10:59 PM   Dictation workstation:   [id number]           Procedures  Procedures     EMERGENCY DEPARTMENT COURSE/MDM:   Medical Decision Making  On exam there appears to be an infection to his right calf.  I did a bedside ultrasound that showed cobblestoning but no discrete abscess or collection to be drained.  We did get a sample of the serous drainage to be sent off for culture.  Will give a dose of Zosyn for full coverage while waiting for labs and imaging.  Concerned that patient may be immunocompromise given his medications.  Patient also given a dose of Toradol for pain control.  I did discuss with him in regards to his allergy to penicillins.  Patient states that was something he was told about when he was a child.  He does not sure if he truly has an allergy.    X-ray imaging shows no evidence of subcutaneous emphysema. He does have a slightly elevated white count of 13.5 which is consistent with acute  infectious process.    After thorough documentation review and speaking with pharmacy we do believe the patient is immune compromised.  Given this we will put him on 2 antibiotic regimen with close follow-up.  Patient was given strict return precautions.  He feels comfortable with discharge plans.  Patient discharged with Augmentin and Cipro.        Diagnoses as of 05/04/25 0011   Cellulitis of right lower extremity        External records reviewed: recent inpatient, clinic, and prior ED notes  Labs and Diagnostic imaging independently reviewed/interpreted by me.    Patient plan, care, lab results and imaging were all discussed with attending.    ED Medications administered this visit:    Medications   ketorolac (Toradol) injection 15 mg (15 mg intravenous Given 5/3/25 2223)   piperacillin-tazobactam (Zosyn) 3.375 g in dextrose (iso) IV 50 mL (0 g intravenous Stopped 5/3/25 3676)     New Prescriptions from this visit:    Discharge Medication List as of 5/3/2025 11:16 PM        START taking these medications    Details   amoxicillin-clavulanate (Augmentin) 875-125 mg tablet Take 1 tablet by mouth every 12 hours for 7 days., Starting Sat 5/3/2025, Until Sat 5/10/2025, Normal      ciprofloxacin (Cipro) 500 mg tablet Take 1 tablet (500 mg) by mouth 2 times a day for 7 days., Starting Sat 5/3/2025, Until Sat 5/10/2025, Normal             (Please note that portions of this note were completed with a voice recognition program.  Efforts were made to edit the dictations but occasionally words are mis-transcribed.)       Elodia Montalvo DO  Resident  05/04/25 0011         [1]   Past Medical History:  Diagnosis Date    Anxiety     Eczema     Hypertension     Other specified health status 02/06/2020    No pertinent past medical history   [2]   Past Surgical History:  Procedure Laterality Date    OTHER SURGICAL HISTORY  02/06/2020    Breast surgery    OTHER SURGICAL HISTORY  12/09/2021    Hand surgery   [3]   Family  History  Problem Relation Name Age of Onset    Breast cancer Mother Cecily     Cancer Mother Cecily     Colon cancer Mother Cecily     Diabetes Father Melvin    [4]   Social History  Socioeconomic History    Marital status:    Tobacco Use    Smoking status: Never    Smokeless tobacco: Never   Substance and Sexual Activity    Alcohol use: Not Currently     Alcohol/week: 1.0 standard drink of alcohol     Types: 1 Glasses of wine per week    Drug use: Yes     Frequency: 1.0 times per week     Types: Marijuana     Comment: 1-2x per month    Sexual activity: Yes     Partners: Female     Birth control/protection: Condom Male        Baldomero Avery MD  05/04/25 0053

## 2025-05-04 NOTE — DISCHARGE INSTRUCTIONS
Please follow-up with your primary care provider in regards to this ED visit.  If you have any worsening redness after 24 hours of antibiotics or start having worsening symptoms please return to the ED for evaluation.

## 2025-05-07 ENCOUNTER — PHARMACY VISIT (OUTPATIENT)
Dept: PHARMACY | Facility: CLINIC | Age: 35
End: 2025-05-07
Payer: COMMERCIAL

## 2025-05-07 LAB
BACTERIA SPEC CULT: ABNORMAL
BACTERIA SPEC CULT: ABNORMAL
GRAM STN SPEC: ABNORMAL
GRAM STN SPEC: ABNORMAL

## 2025-05-12 ENCOUNTER — APPOINTMENT (OUTPATIENT)
Dept: PRIMARY CARE | Facility: CLINIC | Age: 35
End: 2025-05-12
Payer: COMMERCIAL

## 2025-05-12 VITALS
OXYGEN SATURATION: 95 % | HEART RATE: 98 BPM | SYSTOLIC BLOOD PRESSURE: 140 MMHG | WEIGHT: 315 LBS | DIASTOLIC BLOOD PRESSURE: 84 MMHG | RESPIRATION RATE: 16 BRPM | BODY MASS INDEX: 45.56 KG/M2 | TEMPERATURE: 98.4 F

## 2025-05-12 DIAGNOSIS — L03.116 CELLULITIS OF LEFT LOWER EXTREMITY: Primary | ICD-10-CM

## 2025-05-12 DIAGNOSIS — Z88.1 ALLERGY TO CEPHALOSPORIN: ICD-10-CM

## 2025-05-12 DIAGNOSIS — T14.8XXA ABRASION: ICD-10-CM

## 2025-05-12 PROCEDURE — 3079F DIAST BP 80-89 MM HG: CPT

## 2025-05-12 PROCEDURE — 99214 OFFICE O/P EST MOD 30 MIN: CPT

## 2025-05-12 PROCEDURE — 3077F SYST BP >= 140 MM HG: CPT

## 2025-05-12 PROCEDURE — 1036F TOBACCO NON-USER: CPT

## 2025-05-12 PROCEDURE — RXMED WILLOW AMBULATORY MEDICATION CHARGE

## 2025-05-12 RX ORDER — AMOXICILLIN AND CLAVULANATE POTASSIUM 875; 125 MG/1; MG/1
875 TABLET, FILM COATED ORAL 2 TIMES DAILY
Qty: 6 TABLET | Refills: 0 | Status: SHIPPED | OUTPATIENT
Start: 2025-05-12 | End: 2025-05-16

## 2025-05-12 RX ORDER — MUPIROCIN 20 MG/G
OINTMENT TOPICAL 3 TIMES DAILY
Qty: 22 G | Refills: 0 | Status: SHIPPED | OUTPATIENT
Start: 2025-05-12 | End: 2025-05-23

## 2025-05-12 NOTE — PROGRESS NOTES
I saw and evaluated the patient. I personally obtained the key and critical portions of the history and physical exam or was physically present for key and critical portions performed by the resident. I reviewed the resident/fellow's documentation and discussed the patient with the resident/fellow. I agree with the resident/fellow's medical decision making as documented in the note.  Patient's wound does appear to be healing well.  Will continue the Augmentin since the sensitivity of.  This would be sensitive.  And with the improvement.  Patient states improved quite a bit.  He still has a little bit of swelling, little bit of tenderness.  Return to 3 more days, do the topical.  If it still present we will switch him the Bactrim and send him to wound by the end this week.  Patient aware if any chest pain shortness of breath any nausea vomiting diarrhea fever headache any worsening symptoms go to ER  Follow-up in 1 week  Agree with assessment and plan    Leonard Nunes, DO

## 2025-05-12 NOTE — PROGRESS NOTES
Subjective   Les Whitney is a 34 y.o. male who presents for Follow-up (Pt here for follow up visit from hospital visit from staph infection 9 days ago).  Had Staph infection seen in the ED on the 5/3. On the 5th day, has been having improvement. Stopped taking Cipro due to being nauseous and dizzy. Had a total of 5.5 days of Cipro. Nausea and dizziness improved. No more fever or nausea. Is immunocompromised due to   Had a history of penicillin allergy with an amoxicillin trial in the emergency department, tolerated well, finished Augmentin course.  Is interested in allergy testing for the  cephalosporins.        Review of Systems    Objective   Visit Vitals  /84 (BP Location: Right arm, Patient Position: Sitting)   Pulse 98   Temp 36.9 °C (98.4 °F) (Temporal)   Resp 16      Physical Exam  Constitutional:       General: He is awake. He is not in acute distress.     Appearance: Normal appearance. He is not ill-appearing.   Eyes:      General: Lids are normal. Gaze aligned appropriately.   Cardiovascular:      Rate and Rhythm: Normal rate.   Pulmonary:      Effort: Pulmonary effort is normal. No tachypnea, bradypnea, accessory muscle usage, prolonged expiration or respiratory distress.   Skin:     General: Skin is warm and dry.      Coloration: Skin is not jaundiced or pale.      Findings: Rash present.      Comments: Abrasion on left knee   Neurological:      General: No focal deficit present.      Mental Status: He is alert.      Cranial Nerves: No dysarthria or facial asymmetry.   Psychiatric:         Behavior: Behavior normal. Behavior is cooperative.         Assessment & Plan  Cellulitis of left lower extremity  Patient was previously in the emergency department for cellulitis of the left lower extremity which grew pansensitive MSSA.  Completed a 7-day course of Augmentin without any exacerbation of allergies.  Still has erythema around the site though it is markedly improved.  Will give 3 more days of  Augmentin for total of 10 days as well as mupirocin for wound management.  If no improvement or worsening, since patient is on Otezla, will have a low threshold to treat with Bactrim.  Return precautions discussed with patient  If no improvement or worsening over the next 3 days, patient is to call the office.  Orders:    amoxicillin-clavulanate (Augmentin) 875-125 mg tablet; Take 1 tablet (875 mg) by mouth 2 times a day for 3 days.    mupirocin (Bactroban) 2 % ointment; Apply topically 3 times a day for 10 days. 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  Also had listed childhood allergy to amoxicillin which was trialed in the emergency department and tolerated.  I removed this allergy from his list today, will have him see allergist for cephalosporin testing.  Orders:    Referral to Allergy; Future    Abrasion  Patient has abrasion of his left knee, given susceptibility to infections while he is on Otezla, will treat with mupirocin and cover the wound.  Orders:    mupirocin (Bactroban) 2 % ointment; Apply topically 3 times a day for 10 days. apply to affected area             Arcadio Castro DO   05/12/25 2:46 PM

## 2025-05-13 ENCOUNTER — PHARMACY VISIT (OUTPATIENT)
Dept: PHARMACY | Facility: CLINIC | Age: 35
End: 2025-05-13
Payer: COMMERCIAL

## 2025-05-22 ENCOUNTER — SPECIALTY PHARMACY (OUTPATIENT)
Dept: PHARMACY | Facility: CLINIC | Age: 35
End: 2025-05-22

## 2025-05-29 ENCOUNTER — SPECIALTY PHARMACY (OUTPATIENT)
Dept: PHARMACY | Facility: CLINIC | Age: 35
End: 2025-05-29

## 2025-06-03 ENCOUNTER — SPECIALTY PHARMACY (OUTPATIENT)
Dept: PHARMACY | Facility: CLINIC | Age: 35
End: 2025-06-03

## 2025-06-03 PROCEDURE — RXMED WILLOW AMBULATORY MEDICATION CHARGE

## 2025-06-04 ENCOUNTER — APPOINTMENT (OUTPATIENT)
Dept: GASTROENTEROLOGY | Facility: CLINIC | Age: 35
End: 2025-06-04
Payer: COMMERCIAL

## 2025-06-09 ENCOUNTER — PHARMACY VISIT (OUTPATIENT)
Dept: PHARMACY | Facility: CLINIC | Age: 35
End: 2025-06-09
Payer: COMMERCIAL

## 2025-06-11 ENCOUNTER — APPOINTMENT (OUTPATIENT)
Dept: PRIMARY CARE | Facility: CLINIC | Age: 35
End: 2025-06-11
Payer: COMMERCIAL

## 2025-06-16 ENCOUNTER — SPECIALTY PHARMACY (OUTPATIENT)
Dept: PHARMACY | Facility: CLINIC | Age: 35
End: 2025-06-16

## 2025-06-30 ENCOUNTER — SPECIALTY PHARMACY (OUTPATIENT)
Dept: PHARMACY | Facility: CLINIC | Age: 35
End: 2025-06-30

## 2025-06-30 ENCOUNTER — PHARMACY VISIT (OUTPATIENT)
Dept: PHARMACY | Facility: CLINIC | Age: 35
End: 2025-06-30
Payer: COMMERCIAL

## 2025-06-30 PROCEDURE — RXMED WILLOW AMBULATORY MEDICATION CHARGE

## 2025-06-30 RX ORDER — AMOXICILLIN 500 MG/1
500 CAPSULE ORAL 3 TIMES DAILY
Qty: 30 CAPSULE | Refills: 0 | OUTPATIENT
Start: 2025-06-30 | End: 2025-07-10

## 2025-07-12 PROCEDURE — RXMED WILLOW AMBULATORY MEDICATION CHARGE

## 2025-07-14 ENCOUNTER — PHARMACY VISIT (OUTPATIENT)
Dept: PHARMACY | Facility: CLINIC | Age: 35
End: 2025-07-14
Payer: COMMERCIAL

## 2025-07-14 PROCEDURE — RXMED WILLOW AMBULATORY MEDICATION CHARGE

## 2025-07-14 RX ORDER — AMOXICILLIN 500 MG/1
500 CAPSULE ORAL 3 TIMES DAILY
Qty: 15 CAPSULE | Refills: 0 | OUTPATIENT
Start: 2025-07-14

## 2025-07-14 RX ORDER — CHLORHEXIDINE GLUCONATE ORAL RINSE 1.2 MG/ML
SOLUTION DENTAL
Qty: 473 ML | Refills: 0 | OUTPATIENT
Start: 2025-07-14

## 2025-07-17 ENCOUNTER — PHARMACY VISIT (OUTPATIENT)
Dept: PHARMACY | Facility: CLINIC | Age: 35
End: 2025-07-17
Payer: COMMERCIAL

## 2025-07-21 ENCOUNTER — SPECIALTY PHARMACY (OUTPATIENT)
Dept: PHARMACY | Facility: CLINIC | Age: 35
End: 2025-07-21

## 2025-07-28 ENCOUNTER — SPECIALTY PHARMACY (OUTPATIENT)
Dept: PHARMACY | Facility: CLINIC | Age: 35
End: 2025-07-28

## 2025-08-09 ENCOUNTER — PHARMACY VISIT (OUTPATIENT)
Dept: PHARMACY | Facility: CLINIC | Age: 35
End: 2025-08-09
Payer: COMMERCIAL

## 2025-08-09 PROCEDURE — RXMED WILLOW AMBULATORY MEDICATION CHARGE

## 2025-08-18 ENCOUNTER — SPECIALTY PHARMACY (OUTPATIENT)
Dept: PHARMACY | Facility: CLINIC | Age: 35
End: 2025-08-18

## 2025-08-18 PROCEDURE — RXMED WILLOW AMBULATORY MEDICATION CHARGE

## 2025-08-21 ENCOUNTER — PHARMACY VISIT (OUTPATIENT)
Dept: PHARMACY | Facility: CLINIC | Age: 35
End: 2025-08-21
Payer: COMMERCIAL

## 2025-08-21 ENCOUNTER — APPOINTMENT (OUTPATIENT)
Dept: UROLOGY | Facility: CLINIC | Age: 35
End: 2025-08-21
Payer: COMMERCIAL

## 2025-08-21 DIAGNOSIS — Z30.2 ENCOUNTER FOR VASECTOMY: Primary | ICD-10-CM

## 2025-08-21 PROCEDURE — 55250 REMOVAL OF SPERM DUCT(S): CPT | Performed by: STUDENT IN AN ORGANIZED HEALTH CARE EDUCATION/TRAINING PROGRAM

## 2025-09-02 ENCOUNTER — APPOINTMENT (OUTPATIENT)
Dept: ALLERGY | Facility: CLINIC | Age: 35
End: 2025-09-02
Payer: COMMERCIAL

## 2025-09-16 ENCOUNTER — APPOINTMENT (OUTPATIENT)
Dept: PRIMARY CARE | Facility: CLINIC | Age: 35
End: 2025-09-16
Payer: COMMERCIAL

## 2026-01-13 ENCOUNTER — APPOINTMENT (OUTPATIENT)
Dept: ALLERGY | Facility: CLINIC | Age: 36
End: 2026-01-13
Payer: COMMERCIAL